# Patient Record
Sex: FEMALE | Race: BLACK OR AFRICAN AMERICAN | NOT HISPANIC OR LATINO | Employment: OTHER | URBAN - METROPOLITAN AREA
[De-identification: names, ages, dates, MRNs, and addresses within clinical notes are randomized per-mention and may not be internally consistent; named-entity substitution may affect disease eponyms.]

---

## 2017-01-05 ENCOUNTER — OFFICE VISIT (OUTPATIENT)
Dept: ORTHOPEDICS | Facility: CLINIC | Age: 67
End: 2017-01-05
Payer: MEDICARE

## 2017-01-05 ENCOUNTER — HOSPITAL ENCOUNTER (OUTPATIENT)
Dept: RADIOLOGY | Facility: HOSPITAL | Age: 67
Discharge: HOME | End: 2017-01-05
Attending: ORTHOPAEDIC SURGERY
Payer: MEDICARE

## 2017-01-05 VITALS
RESPIRATION RATE: 18 BRPM | BODY MASS INDEX: 43.57 KG/M2 | HEIGHT: 68 IN | DIASTOLIC BLOOD PRESSURE: 83 MMHG | HEART RATE: 71 BPM | SYSTOLIC BLOOD PRESSURE: 148 MMHG | WEIGHT: 287.5 LBS

## 2017-01-05 DIAGNOSIS — M17.0 PRIMARY OSTEOARTHRITIS OF BOTH KNEES: ICD-10-CM

## 2017-01-05 DIAGNOSIS — G89.29 CHRONIC PAIN OF BOTH KNEES: Primary | ICD-10-CM

## 2017-01-05 DIAGNOSIS — M25.561 CHRONIC PAIN OF BOTH KNEES: ICD-10-CM

## 2017-01-05 DIAGNOSIS — G89.29 CHRONIC PAIN OF BOTH KNEES: ICD-10-CM

## 2017-01-05 DIAGNOSIS — M25.562 CHRONIC PAIN OF BOTH KNEES: ICD-10-CM

## 2017-01-05 DIAGNOSIS — M25.562 CHRONIC PAIN OF BOTH KNEES: Primary | ICD-10-CM

## 2017-01-05 DIAGNOSIS — M25.561 CHRONIC PAIN OF BOTH KNEES: Primary | ICD-10-CM

## 2017-01-05 PROCEDURE — 99999 PR PBB SHADOW E&M-EST. PATIENT-LVL III: CPT | Mod: PBBFAC,,, | Performed by: ORTHOPAEDIC SURGERY

## 2017-01-05 PROCEDURE — 73562 X-RAY EXAM OF KNEE 3: CPT | Mod: 26,50,, | Performed by: RADIOLOGY

## 2017-01-05 PROCEDURE — 73562 X-RAY EXAM OF KNEE 3: CPT | Mod: TC,50

## 2017-01-05 PROCEDURE — 99214 OFFICE O/P EST MOD 30 MIN: CPT | Mod: S$PBB,,, | Performed by: ORTHOPAEDIC SURGERY

## 2017-01-05 NOTE — PROGRESS NOTES
HPI:    Ena Miller is a 66 y.o. female who is here today for   Chief Complaint   Patient presents with    Left Knee - Pain    Right Knee - Pain   .  Patient has osteoarthritis of both knees.  She is not interested in surgery.  She is interested in physical therapy.    Duration: 12 months  Intensity: severe  Character of pain: sharp  Location: She reports that the pain is predominately  medial    Past Medical History   Diagnosis Date    Angina pectoris     Diabetes mellitus type I     Hypertension     Sleep apnea           Current Outpatient Prescriptions:     atorvastatin (LIPITOR) 80 MG tablet, 80 mg once daily. , Disp: , Rfl:     diltiazem (CARDIZEM) 120 MG tablet, 120 mg every 12 (twelve) hours. , Disp: , Rfl:     hydrALAZINE (APRESOLINE) 50 MG tablet, 50 mg every 8 (eight) hours. , Disp: , Rfl:     isosorbide dinitrate (ISORDIL) 30 MG Tab, 30 mg 2 (two) times daily. , Disp: , Rfl:     KLOR-CON M20 20 mEq tablet, 20 mEq 2 (two) times daily. , Disp: , Rfl:     losartan (COZAAR) 100 MG tablet, 100 mg once daily. , Disp: , Rfl:     metformin (GLUCOPHAGE) 500 MG tablet, 500 mg 2 (two) times daily with meals. Pt states takes two (500) mg every evening, Disp: , Rfl:     polyethylene glycol (GLYCOLAX) 17 gram/dose powder, 17 g once daily. , Disp: , Rfl:     tramadol (ULTRAM) 50 mg tablet, 50 mg as needed. , Disp: , Rfl:     triamterene-hydrochlorothiazide 37.5-25 mg (MAXZIDE-25) 37.5-25 mg per tablet, 1 tablet once daily. , Disp: , Rfl:     dexamethasone (DECADRON) 4 mg/mL injection, 1/2 vial for each iontophoresis tx., Disp: 5 mL, Rfl: 3     Review of patient's allergies indicates:   Allergen Reactions    Penicillins Shortness Of Breath    Flagyl [metronidazole hcl]         ROS  Constitutional: Negative for fever, Negative for weight loss  HENT Negative for congestion  Cardiovascular: Negative chest pain  Respiratory: Negative Shortness of breath  Heme: Negative excessive  "bleeding  Skin:NegativeItching, Negative breakdown  Musculoskeletal:Negative for back pain, Positive for joint pain, Negative muscle pain, Negative muscle weakness  Neurological: Negative for numbness and paresthesias   Psychiatric/Behavioral: Negative altered mental status, Negative for depression    Physical Exam:   Visit Vitals    BP (!) 148/83 (BP Location: Right arm, Patient Position: Sitting, BP Method: Automatic)    Pulse 71    Resp 18    Ht 5' 8" (1.727 m)    Wt 130.4 kg (287 lb 7.7 oz)    BMI 43.71 kg/m2     General appearance: This is a well-developed, Well nourished female No obvious acute distress.  Psychiatric: normal mood and affect;  pleasant and conversant; judgment and thought content normal  Gait is coordinated. Patient has antalgic gait to the right, left and bilateral  Cardiovascular: There are no swelling or varicosities present.   Respiratory: normal respiratory effort   Lymphatic: no adenopathy   Neurologic: alert and oriented to person, place, and time   Deep Tendon Reflexes are normal;  Coordination and Balance: Normal   Musculoskeletal   Neck    ROM shows normal flexion and extension and lateral rotation    Palpation: Non-tender    Stability is normal    Strength is normal    Skin is normal without masses and lesions    Sensation is intact to light touch   Back    ROM showsnormal flexion, extension    and  rotation    Palpation shows no masses    Stability is normal    Strength to flexion and extension well maintained    Core strength is diminished    Skin shows no rashes or cafe au lait spots;     Sensation is intact to light touch    Right Knee  Swelling Mild  TendernessMedial joint line  Range of Motion:     Left Knee: Swelling Mild  TendernessMedial joint line  Range of Motion: 115    Radiograph   Osteoarthritis: severe  Angle: mild varus    Assessment: Osteoarthritis of the knees.    Plan:  I've written for the patient to get physical therapy and and she'll work on her " weight reduction.  Present BMI is 43.7 and I've explained the risk to her.

## 2017-06-08 ENCOUNTER — OFFICE VISIT (OUTPATIENT)
Dept: OPHTHALMOLOGY | Facility: CLINIC | Age: 67
End: 2017-06-08
Payer: MEDICARE

## 2017-06-08 DIAGNOSIS — H10.9 BACTERIAL CONJUNCTIVITIS OF LEFT EYE: Primary | ICD-10-CM

## 2017-06-08 PROCEDURE — 1159F MED LIST DOCD IN RCRD: CPT | Mod: ,,, | Performed by: OPHTHALMOLOGY

## 2017-06-08 PROCEDURE — 99213 OFFICE O/P EST LOW 20 MIN: CPT | Mod: PBBFAC | Performed by: OPHTHALMOLOGY

## 2017-06-08 PROCEDURE — 99203 OFFICE O/P NEW LOW 30 MIN: CPT | Mod: S$PBB,,, | Performed by: OPHTHALMOLOGY

## 2017-06-08 PROCEDURE — 99999 PR PBB SHADOW E&M-EST. PATIENT-LVL III: CPT | Mod: PBBFAC,,, | Performed by: OPHTHALMOLOGY

## 2017-06-08 PROCEDURE — 1126F AMNT PAIN NOTED NONE PRSNT: CPT | Mod: ,,, | Performed by: OPHTHALMOLOGY

## 2017-06-08 RX ORDER — GABAPENTIN 300 MG/1
CAPSULE ORAL
COMMUNITY
Start: 2017-05-13

## 2017-06-08 RX ORDER — POLYMYXIN B SULFATE AND TRIMETHOPRIM 1; 10000 MG/ML; [USP'U]/ML
SOLUTION OPHTHALMIC
COMMUNITY
Start: 2017-06-07

## 2017-06-08 RX ORDER — CLARITHROMYCIN 500 MG/1
TABLET, FILM COATED ORAL
Refills: 0 | COMMUNITY
Start: 2017-03-09 | End: 2017-06-08

## 2017-06-08 RX ORDER — LEVOFLOXACIN 500 MG/1
TABLET, FILM COATED ORAL
Refills: 0 | COMMUNITY
Start: 2017-03-09 | End: 2018-10-30 | Stop reason: ALTCHOICE

## 2017-06-08 NOTE — PATIENT INSTRUCTIONS
Continue the eye drops for another 7 to 10 days. If eye clears up, you can call for an appointment for eyeglasses. If the problem is not getting better, call for an appointment with me.

## 2017-06-08 NOTE — PROGRESS NOTES
HPI     Triage pt  Patient here f/u Urgent Care.  Pt states OS red and swollen x 1 day ago. Mucus and tearing     Eye Drops:Polymyxin TID OS    Patient reports onset of symptoms 2 days ago with left eye redness,   tearing, crusting.   Patient denies FBS, photophobia, trauma, contact lens use.   Recent URI symptoms for past week.     PMHx  HTN  DM  Angina  VENANCIO    POH  Denies surgery, trauma, contacts  Wears glasses    Last edited by Zak Cartagena MD on 6/8/2017  4:26 PM. (History)            Assessment /Plan     For exam results, see Encounter Report.    Bacterial conjunctivitis of left eye    Continue Polymixin TID OS for 7 to 10 days. If not clearing up, will call in for follow up.

## 2017-11-15 DIAGNOSIS — M17.0 PRIMARY OSTEOARTHRITIS OF BOTH KNEES: Primary | ICD-10-CM

## 2017-11-15 DIAGNOSIS — M51.36 DEGENERATION OF LUMBAR INTERVERTEBRAL DISC: ICD-10-CM

## 2018-01-23 ENCOUNTER — OFFICE VISIT (OUTPATIENT)
Dept: DERMATOLOGY | Facility: CLINIC | Age: 68
End: 2018-01-23
Payer: MEDICARE

## 2018-01-23 DIAGNOSIS — R21 RASH AND NONSPECIFIC SKIN ERUPTION: Primary | ICD-10-CM

## 2018-01-23 DIAGNOSIS — H01.133 ECZEMATOUS DERMATITIS OF EYELIDS OF BOTH EYES: ICD-10-CM

## 2018-01-23 DIAGNOSIS — H01.136 ECZEMATOUS DERMATITIS OF EYELIDS OF BOTH EYES: ICD-10-CM

## 2018-01-23 PROCEDURE — 88305 TISSUE EXAM BY PATHOLOGIST: CPT | Mod: 26,,, | Performed by: PATHOLOGY

## 2018-01-23 PROCEDURE — 99999 PR PBB SHADOW E&M-EST. PATIENT-LVL III: CPT | Mod: PBBFAC,,, | Performed by: PATHOLOGY

## 2018-01-23 PROCEDURE — 99203 OFFICE O/P NEW LOW 30 MIN: CPT | Mod: 25,S$PBB,, | Performed by: PATHOLOGY

## 2018-01-23 PROCEDURE — 11100 PR BIOPSY OF SKIN LESION: CPT | Mod: PBBFAC | Performed by: PATHOLOGY

## 2018-01-23 PROCEDURE — 11101 PR BIOPSY, EACH ADDED LESION: CPT | Mod: S$PBB,,, | Performed by: PATHOLOGY

## 2018-01-23 PROCEDURE — 99213 OFFICE O/P EST LOW 20 MIN: CPT | Mod: PBBFAC,25 | Performed by: PATHOLOGY

## 2018-01-23 PROCEDURE — 11101 PR BIOPSY, EACH ADDED LESION: CPT | Mod: PBBFAC | Performed by: PATHOLOGY

## 2018-01-23 PROCEDURE — 88341 IMHCHEM/IMCYTCHM EA ADD ANTB: CPT | Mod: 26,,, | Performed by: PATHOLOGY

## 2018-01-23 PROCEDURE — 11100 PR BIOPSY OF SKIN LESION: CPT | Mod: S$PBB,,, | Performed by: PATHOLOGY

## 2018-01-23 PROCEDURE — 88342 IMHCHEM/IMCYTCHM 1ST ANTB: CPT | Mod: 26,,, | Performed by: PATHOLOGY

## 2018-01-23 PROCEDURE — 88312 SPECIAL STAINS GROUP 1: CPT | Mod: 26,,, | Performed by: PATHOLOGY

## 2018-01-23 PROCEDURE — 81342 TRG GENE REARRANGEMENT ANAL: CPT | Performed by: PATHOLOGY

## 2018-01-23 RX ORDER — HYDROCORTISONE 25 MG/G
CREAM TOPICAL 2 TIMES DAILY
Qty: 30 G | Refills: 1 | Status: SHIPPED | OUTPATIENT
Start: 2018-01-23 | End: 2023-10-06

## 2018-01-23 NOTE — LETTER
January 23, 2018      Rohith Bassett MD  [address] [facility] [address]           [facility] - Dermatology  [address]  [place] [address]  Phone: [phone]  Fax: [phone]          Patient: Ena Miller   MR Number: [id number]   YOB: 1950   Date of Visit: 1/23/2018       Dear Dr. Rohith Bassett:    Thank you for referring Ena Miller to me for evaluation. Attached you will find relevant portions of my assessment and plan of care.    If you have questions, please do not hesitate to call me. I look forward to following Ena Miller along with you.    Sincerely,    Shaun Parra MD    Enclosure  CC:  No Recipients    If you would like to receive this communication electronically, please contact [email or web address] or [phone] to request more information on [facility] access.    For providers and/or their staff who would like to refer a patient to [facility], please contact us through our one-stop-shop provider referral line, [facility], at [phone].    If you feel you have received this communication in error or would no longer like to receive these types of communications, please e-mail [email or web address]

## 2018-01-23 NOTE — PROGRESS NOTES
Concerns is spots on her body.  Subjective:       Patient ID:  Ena Miller is a 67 y.o. female who presents for   Chief Complaint   Patient presents with    Spot     HPI  Pt with about 2 year h/o recurrent red, scaly spots to torso and extremities - start as red bumps and eventually scab over.  Heal with hyperpigmentation.  Minimally pruritic.  Was told that it was pityriasis rosea in the past.  Not treating.  Also with dark scaly areas to bilateral eyelids and periorbital area x several months - pruritic; not treating.    Review of Systems   Constitutional: Positive for weight loss and fatigue. Negative for fever, chills, weight gain, night sweats and malaise.   Skin: Positive for itching, rash, dry skin and dry lips. Negative for sun sensitivity, daily sunscreen use, recent sunburn, abscesses and wears hat.        Objective:    Physical Exam   Constitutional: She appears well-developed and well-nourished.   Neurological: She is alert.   Skin:   Areas Examined (abnormalities noted in diagram):   Scalp / Hair Palpated and Inspected  Head / Face Inspection Performed  Neck Inspection Performed  Chest / Axilla Inspection Performed  Abdomen Inspection Performed  Genitals / Buttocks / Groin Inspection Performed  Back Inspection Performed  RUE Inspected  LUE Inspection Performed  RLE Inspected  LLE Inspection Performed  Nails and Digits Inspection Performed                   Diagram Legend     Erythematous scaling macule/papule c/w actinic keratosis       Vascular papule c/w angioma      Pigmented verrucoid papule/plaque c/w seborrheic keratosis      Yellow umbilicated papule c/w sebaceous hyperplasia      Irregularly shaped tan macule c/w lentigo     1-2 mm smooth white papules consistent with Milia      Movable subcutaneous cyst with punctum c/w epidermal inclusion cyst      Subcutaneous movable cyst c/w pilar cyst      Firm pink to brown papule c/w dermatofibroma      Pedunculated fleshy papule(s) c/w skin tag(s)       Evenly pigmented macule c/w junctional nevus     Mildly variegated pigmented, slightly irregular-bordered macule c/w mildly atypical nevus      Flesh colored to evenly pigmented papule c/w intradermal nevus       Pink pearly papule/plaque c/w basal cell carcinoma      Erythematous hyperkeratotic cursted plaque c/w SCC      Surgical scar with no sign of skin cancer recurrence      Open and closed comedones      Inflammatory papules and pustules      Verrucoid papule consistent consistent with wart     Erythematous eczematous patches and plaques     Dystrophic onycholytic nail with subungual debris c/w onychomycosis     Umbilicated papule    Erythematous-base heme-crusted tan verrucoid plaque consistent with inflamed seborrheic keratosis     Erythematous Silvery Scaling Plaque c/w Psoriasis     See annotation      Assessment / Plan:   Rule Out - Biopsy 1: Lichen Planus versus Pityriasis Lichenoides Chronica versus Pityriasis Lichenoides Et Varioliformis Acuta versus Other.    Rule Out - Biopsy 2: Lichen Planus versus Pityriasis Lichenoides Chronica versus Pityriasis Lichenoides Et Varioliformis Acuta versus Other.        Pathology Orders:     Normal Orders This Visit    Tissue Specimen To Pathology, Dermatology     Questions:    Directional Terms:  Other(comment)    Left    Lateral    Clinical information:  erythematous, scaly papules; r/o PLC vs LP vs other    Specific Site:  thigh    Tissue Specimen To Pathology, Dermatology     Questions:    Directional Terms:  Other(comment)    Right    Clinical information:  erythematous, scaly papules; r/o PLC vs LP vs other    Specific Site:  chest        Rash and nonspecific skin eruption  -     Tissue Specimen To Pathology, Dermatology  -     Tissue Specimen To Pathology, Dermatology    Punch biopsy procedure note:  Punch biopsies x 2 performed after verbal consent obtained. Area marked and prepped with alcohol. Approximately 1cc of 1% lidocaine with epinephrine  injected. 3 mm disposable punch used to remove lesion. Hemostasis obtained and biopsy site closed with 1 - 2 Prolene sutures. Wound care instructions reviewed with patient and handout given.      Eczematous dermatitis of eyelids of both eyes  -     hydrocortisone 2.5 % cream; Apply topically 2 (two) times daily. To affected areas of eyelids; do not get in eyes  Dispense: 30 g; Refill: 1             Follow-up in about 2 weeks (around 2/6/2018).

## 2018-01-23 NOTE — PATIENT INSTRUCTIONS
Punch Biopsy Wound Care    Your doctor has performed a punch biopsy today.  A band aid and antibiotic ointment has been placed over the site.  This should remain in place for 24 hours.  It is recommended that you keep the area dry for the first 24 hours.  After 24 hours, you may remove the band aid and wash the area with warm soap and water and apply Vaseline jelly.  Many patients prefer to use Neosporin or Bacitracin ointment.  This is acceptable; however know that you can develop an allergy to this medication even if you have used it safely for years.  It is important to keep the area moist.  Letting it dry out and get air slows healing time, will worsen the scar, and make it more difficult to remove the stitches if they were placed.  Band aid is optional after first 24 hours.      If you notice increasing redness, tenderness, pain, or yellow drainage at the biopsy or surgical site, please notify your doctor.  These are signs of an infection.    If your biopsy/surgical site is bleeding, apply firm pressure for 15 minutes straight.  Repeat for another 15 minutes, if it is still bleeding.   If the surgical site continues to bleed, then please contact your doctor.      [address] [place] [address]/ [phone] FAX/ [email or web address]

## 2018-02-08 ENCOUNTER — TELEPHONE (OUTPATIENT)
Dept: DERMATOLOGY | Facility: CLINIC | Age: 68
End: 2018-02-08

## 2018-02-08 ENCOUNTER — OFFICE VISIT (OUTPATIENT)
Dept: DERMATOLOGY | Facility: CLINIC | Age: 68
End: 2018-02-08
Payer: MEDICARE

## 2018-02-08 DIAGNOSIS — Z48.02 VISIT FOR SUTURE REMOVAL: Primary | ICD-10-CM

## 2018-02-08 DIAGNOSIS — L41.1 PLC (PITYRIASIS LICHENOIDES CHRONICA): ICD-10-CM

## 2018-02-08 PROCEDURE — 99999 PR PBB SHADOW E&M-EST. PATIENT-LVL II: CPT | Mod: PBBFAC,,, | Performed by: PATHOLOGY

## 2018-02-08 PROCEDURE — 99024 POSTOP FOLLOW-UP VISIT: CPT | Mod: POP,,, | Performed by: PATHOLOGY

## 2018-02-08 PROCEDURE — 99212 OFFICE O/P EST SF 10 MIN: CPT | Mod: PBBFAC | Performed by: PATHOLOGY

## 2018-02-08 RX ORDER — TRIAMCINOLONE ACETONIDE 1 MG/G
CREAM TOPICAL
Qty: 454 G | Refills: 3 | Status: SHIPPED | OUTPATIENT
Start: 2018-02-08 | End: 2018-03-12 | Stop reason: SDUPTHER

## 2018-02-08 NOTE — TELEPHONE ENCOUNTER
Left message for patient to give us a call back   ----- Message from Shaun Parra MD sent at 2/8/2018 10:44 AM CST -----  Please call pt and let her know I sent rx for triamcinolone cream to her [facility].  Have her use this bid to all of the scaly areas.  We can talk about other treatment options at her followup appt (like minocycline, MTX or light treatments).  [facility] set up about 2 month f/u appt.  Vivek  bv

## 2018-02-08 NOTE — PROGRESS NOTES
Suture Removal note:  CC: 67 y.o. female patient is here for suture removal.         HPI: Patient is s/p excision of changes most consistent with pityriasis lichenoides chronica from the left lateral thigh and right chest on 01/23/2018.  Patient reports no problems.    WOUND PE:  Sutures intact.  Wound healing well.  Good approximation of skin edges.  No signs or symptoms of infection.    IMPRESSION: 1. Skin, left lateral thigh, punch biopsy:  - CHANGES MOST CONSISTENT WITH PITYRIASIS LICHENOIDES CHRONICA (SEE MICROSCOPIC  DESCRIPTION). MICROSCOPIC DESCRIPTION: The biopsy shows confluent focal parakeratosis. The epidermis is remarkable for  exocytosis of lymphocytes, mild spongiosis and vacuolar-interface changes with dyskeratotic cells located at various  levels throughout the epidermis. There is a superficial and mid dermal vaguely wedge shaped lymphohistiocytic  infiltrate. Extravasated erythocytes are noted within the papillary dermis and epidermis. CD1a  immunohistochemical stain reveals rare intraepidermal Langerhans cells. Appropriately reactive controls were  reviewed. Multiple levels were examined.  2. Skin, right chest, punch biopsy:  - CHANGES MOST CONSISTENT WITH PITYRIASIS LICHENOIDES CHRONICA (SEE MICROSCOPIC  DESCRIPTION).  MICROSCOPIC DESCRIPTION: The biopsy shows confluent parakeratosis that is focally associated with  neutrophils. The epidermis is remarkable for prominent exocytosis of lymphocytes, mild spongiosis and  vacuolar-interface changes with dyskeratotic cells located at various levels throughout the epidermis. There is a  superficial and mid dermal wedge shaped lymphohistiocytic infiltrate. Extravasated erythocytes are noted within the  papillary dermis and epidermis. The majority of the lymphocytes within the superficial dermal infiltrate and within  the epidermis stain positive for both CD3 and CD4, with CD8 staining a somewhat sparser population of cells. The  approximate CD4 to CD8  ratio is 5:1. Appropriately reactive controls were reviewed. Multiple levels were  examined. T-cell receptor gene rearrangement analysis is pending, and results will be reported in an addendum.      PLAN:  Sutures removed today.  Continue wound care.  Pt had to leave before being seen by MD.  Prescribed TAC cream bid to affected areas.  Will consider tetracycline family antibiotic vs MTX vs nbUVB at next visit.    RTC: PRN

## 2018-02-09 ENCOUNTER — TELEPHONE (OUTPATIENT)
Dept: DERMATOLOGY | Facility: CLINIC | Age: 68
End: 2018-02-09

## 2018-02-09 NOTE — TELEPHONE ENCOUNTER
----- Message from Shaun Parra MD sent at 2/8/2018 10:44 AM CST -----  Please call pt and let her know I sent rx for triamcinolone cream to her [facility].  Have her use this bid to all of the scaly areas.  We can talk about other treatment options at her followup appt (like minocycline, MTX or light treatments).  [facility] set up about 2 month f/u appt.  jr Doyle

## 2018-02-16 ENCOUNTER — TELEPHONE (OUTPATIENT)
Dept: DERMATOLOGY | Facility: CLINIC | Age: 68
End: 2018-02-16

## 2018-03-07 ENCOUNTER — TELEPHONE (OUTPATIENT)
Dept: DERMATOLOGY | Facility: CLINIC | Age: 68
End: 2018-03-07

## 2018-03-07 NOTE — TELEPHONE ENCOUNTER
Left message for patient to call us back when she gets home   ----- Message from Joyce Brenner sent at 3/7/2018  9:30 AM CST -----  Contact: pt   BV-pt- pt is returning the nurses phone call about her prescription pt was told Dr Parra ordered some medication for her can you please call pt at [phone] pt won't be back home until 3:30 pt goes to [facility] pharmacy [facility] in [place] phone number [phone]    GLENN

## 2018-03-09 ENCOUNTER — TELEPHONE (OUTPATIENT)
Dept: DERMATOLOGY | Facility: CLINIC | Age: 68
End: 2018-03-09

## 2018-03-09 NOTE — TELEPHONE ENCOUNTER
----- Message from Joyce Brenner sent at 3/9/2018 12:53 PM CST -----  Contact: pt   BV-pt- pt is calling to speak with the nurse pt was told she has a prescription for the stuff on her body pt said she hasn't received her prescription can you please call pt at  [phone] pt needs her medication before her next appt on April 19th     GLENN

## 2018-03-12 DIAGNOSIS — L41.1 PLC (PITYRIASIS LICHENOIDES CHRONICA): ICD-10-CM

## 2018-03-12 RX ORDER — TRIAMCINOLONE ACETONIDE 1 MG/G
CREAM TOPICAL
Qty: 454 G | Refills: 3 | Status: SHIPPED | OUTPATIENT
Start: 2018-03-12

## 2018-03-13 ENCOUNTER — TELEPHONE (OUTPATIENT)
Dept: DERMATOLOGY | Facility: CLINIC | Age: 68
End: 2018-03-13

## 2018-03-13 NOTE — TELEPHONE ENCOUNTER
----- Message from Shaun Parra MD sent at 3/12/2018 11:31 AM CDT -----  Done! Please let her know the prescription was sent!  bv  ----- Message -----  From: Michael Gruber LPN  Sent: 3/9/2018   1:07 PM  To: Shaun Parra MD    Pt. Would like the TAC sent to a different [facility] has been updated in file.

## 2018-04-19 ENCOUNTER — OFFICE VISIT (OUTPATIENT)
Dept: DERMATOLOGY | Facility: CLINIC | Age: 68
End: 2018-04-19
Payer: MEDICARE

## 2018-04-19 DIAGNOSIS — L41.1 PLC (PITYRIASIS LICHENOIDES CHRONICA): Primary | ICD-10-CM

## 2018-04-19 DIAGNOSIS — L91.0 KELOID: ICD-10-CM

## 2018-04-19 PROCEDURE — 99999 PR PBB SHADOW E&M-EST. PATIENT-LVL III: CPT | Mod: PBBFAC,,, | Performed by: PATHOLOGY

## 2018-04-19 PROCEDURE — 11900 INJECT SKIN LESIONS </W 7: CPT | Mod: PBBFAC | Performed by: PATHOLOGY

## 2018-04-19 PROCEDURE — 11900 INJECT SKIN LESIONS </W 7: CPT | Mod: S$PBB,,, | Performed by: PATHOLOGY

## 2018-04-19 PROCEDURE — 99214 OFFICE O/P EST MOD 30 MIN: CPT | Mod: S$PBB,25,, | Performed by: PATHOLOGY

## 2018-04-19 PROCEDURE — 99213 OFFICE O/P EST LOW 20 MIN: CPT | Mod: PBBFAC | Performed by: PATHOLOGY

## 2018-04-19 RX ORDER — METFORMIN HYDROCHLORIDE 1000 MG/1
TABLET, FILM COATED, EXTENDED RELEASE ORAL
COMMUNITY
Start: 2018-02-07

## 2018-04-19 RX ORDER — MINOCYCLINE HYDROCHLORIDE 100 MG/1
100 CAPSULE ORAL EVERY 12 HOURS
Qty: 60 CAPSULE | Refills: 2 | Status: SHIPPED | OUTPATIENT
Start: 2018-04-19 | End: 2018-10-30 | Stop reason: ALTCHOICE

## 2018-04-19 NOTE — PROGRESS NOTES
Subjective:       Patient ID:  Ena Miller is a 67 y.o. female who presents for   Chief Complaint   Patient presents with    Follow-up     Rash     History of Present Illness: The patient presents for follow up of a 2 year history of a rash. Last seen 1/2018 at which time lesions were concerning for PLC, and had two biopsies. Lesions continue to pop up and she feels like her rash is worse. Does itch.   She is using triamcinolone cream and hydrocortisone on the face.       1. Skin, left lateral thigh, punch biopsy:  - CHANGES MOST CONSISTENT WITH PITYRIASIS LICHENOIDES CHRONICA (SEE MICROSCOPIC  DESCRIPTION).  MICROSCOPIC DESCRIPTION: The biopsy shows confluent focal parakeratosis. The epidermis is remarkable for  exocytosis of lymphocytes, mild spongiosis and vacuolar-interface changes with dyskeratotic cells located at various  levels throughout the epidermis. There is a superficial and mid dermal vaguely wedge shaped lymphohistiocytic  infiltrate. Extravasated erythocytes are noted within the papillary dermis and epidermis. CD1a  immunohistochemical stain reveals rare intraepidermal Langerhans cells. Appropriately reactive controls were  reviewed. Multiple levels were examined.    2. Skin, right chest, punch biopsy:  - CHANGES MOST CONSISTENT WITH PITYRIASIS LICHENOIDES CHRONICA (SEE MICROSCOPIC  DESCRIPTION).  MICROSCOPIC        Review of Systems   Constitutional: Negative for fever, chills and fatigue.   Skin: Positive for itching and rash.   Hematologic/Lymphatic: Bruises/bleeds easily.        Objective:    Physical Exam   Constitutional: She appears well-developed and well-nourished.   Neurological: She is alert.   Skin:   Areas Examined (abnormalities noted in diagram):   Scalp / Hair Palpated and Inspected  Head / Face Inspection Performed  Neck Inspection Performed  Chest / Axilla Inspection Performed  Abdomen Inspection Performed  Genitals / Buttocks / Groin Inspection Performed  Back Inspection  Performed  RUE Inspected  LUE Inspection Performed  RLE Inspected  LLE Inspection Performed  Nails and Digits Inspection Performed                   Diagram Legend     Erythematous scaling macule/papule c/w actinic keratosis       Vascular papule c/w angioma      Pigmented verrucoid papule/plaque c/w seborrheic keratosis      Yellow umbilicated papule c/w sebaceous hyperplasia      Irregularly shaped tan macule c/w lentigo     1-2 mm smooth white papules consistent with Milia      Movable subcutaneous cyst with punctum c/w epidermal inclusion cyst      Subcutaneous movable cyst c/w pilar cyst      Firm pink to brown papule c/w dermatofibroma      Pedunculated fleshy papule(s) c/w skin tag(s)      Evenly pigmented macule c/w junctional nevus     Mildly variegated pigmented, slightly irregular-bordered macule c/w mildly atypical nevus      Flesh colored to evenly pigmented papule c/w intradermal nevus       Pink pearly papule/plaque c/w basal cell carcinoma      Erythematous hyperkeratotic cursted plaque c/w SCC      Surgical scar with no sign of skin cancer recurrence      Open and closed comedones      Inflammatory papules and pustules      Verrucoid papule consistent consistent with wart     Erythematous eczematous patches and plaques     Dystrophic onycholytic nail with subungual debris c/w onychomycosis     Umbilicated papule    Erythematous-base heme-crusted tan verrucoid plaque consistent with inflamed seborrheic keratosis     Erythematous Silvery Scaling Plaque c/w Psoriasis     See annotation      Assessment / Plan:        PLC (pityriasis lichenoides chronica)  -     minocycline (MINOCIN,DYNACIN) 100 MG capsule; Take 1 capsule (100 mg total) by mouth every 12 (twelve) hours.  Dispense: 60 capsule; Refill: 2    - Patient with two bx'es showing PLC. Discussed diagnosis with patient and options for treatment.  - Start minocycline now. Can use phototherapy if no improvement.  - Continue topical steroids to areas  BID.  - Future workup could include repeat TCR gene study (invalid sample) but given dx of PLC will defer this for now.       Keloids  - Patient with keloids on right neck and right chest from prior lesions.  - Injected ILK today.  Intralesional Kenalog 10 mg/cc (0.15 cc total) injected into 2 lesions on the right neck and right chest today after obtaining verbal consent including risk of surrounding hypopigmentation. Patient tolerated procedure well.    Units: 1  NDC for Kenalog 10mg/cc:  9467-1723-55          Follow-up in about 2 months (around 6/19/2018).

## 2018-09-21 DIAGNOSIS — M54.30 SCIATICA: ICD-10-CM

## 2018-09-21 DIAGNOSIS — M54.50 CHRONIC LUMBAR PAIN: Primary | ICD-10-CM

## 2018-09-21 DIAGNOSIS — M17.0 PRIMARY OSTEOARTHRITIS OF BOTH KNEES: ICD-10-CM

## 2018-09-21 DIAGNOSIS — M54.50 LUMBAGO: Primary | ICD-10-CM

## 2018-09-21 DIAGNOSIS — G89.29 CHRONIC LUMBAR PAIN: Primary | ICD-10-CM

## 2018-09-21 DIAGNOSIS — M51.36 DEGENERATION OF LUMBAR INTERVERTEBRAL DISC: ICD-10-CM

## 2018-10-04 ENCOUNTER — HOSPITAL ENCOUNTER (OUTPATIENT)
Dept: RADIOLOGY | Facility: HOSPITAL | Age: 68
Discharge: HOME | End: 2018-10-04
Attending: ORTHOPAEDIC SURGERY
Payer: MEDICARE

## 2018-10-04 ENCOUNTER — HOSPITAL ENCOUNTER (OUTPATIENT)
Dept: RADIOLOGY | Facility: HOSPITAL | Age: 68
Discharge: HOME | End: 2018-10-04
Attending: INTERNAL MEDICINE
Payer: MEDICARE

## 2018-10-04 ENCOUNTER — OFFICE VISIT (OUTPATIENT)
Dept: ORTHOPEDICS | Facility: CLINIC | Age: 68
End: 2018-10-04
Payer: MEDICARE

## 2018-10-04 VITALS
HEART RATE: 79 BPM | SYSTOLIC BLOOD PRESSURE: 145 MMHG | BODY MASS INDEX: 41.83 KG/M2 | WEIGHT: 276 LBS | DIASTOLIC BLOOD PRESSURE: 83 MMHG | HEIGHT: 68 IN

## 2018-10-04 DIAGNOSIS — M25.562 CHRONIC PAIN OF BOTH KNEES: Primary | ICD-10-CM

## 2018-10-04 DIAGNOSIS — M25.561 CHRONIC PAIN OF BOTH KNEES: ICD-10-CM

## 2018-10-04 DIAGNOSIS — G89.29 CHRONIC PAIN OF BOTH KNEES: Primary | ICD-10-CM

## 2018-10-04 DIAGNOSIS — M25.561 CHRONIC PAIN OF BOTH KNEES: Primary | ICD-10-CM

## 2018-10-04 DIAGNOSIS — G89.29 CHRONIC PAIN OF BOTH KNEES: ICD-10-CM

## 2018-10-04 DIAGNOSIS — G89.29 CHRONIC LUMBAR PAIN: ICD-10-CM

## 2018-10-04 DIAGNOSIS — M54.50 CHRONIC LUMBAR PAIN: ICD-10-CM

## 2018-10-04 DIAGNOSIS — M25.562 CHRONIC PAIN OF BOTH KNEES: ICD-10-CM

## 2018-10-04 PROCEDURE — 99214 OFFICE O/P EST MOD 30 MIN: CPT | Mod: S$PBB,,, | Performed by: ORTHOPAEDIC SURGERY

## 2018-10-04 PROCEDURE — 73562 X-RAY EXAM OF KNEE 3: CPT | Mod: 26,50,, | Performed by: RADIOLOGY

## 2018-10-04 PROCEDURE — 99999 PR PBB SHADOW E&M-EST. PATIENT-LVL III: CPT | Mod: PBBFAC,,, | Performed by: ORTHOPAEDIC SURGERY

## 2018-10-04 PROCEDURE — 73562 X-RAY EXAM OF KNEE 3: CPT | Mod: TC,50

## 2018-10-04 PROCEDURE — 72148 MRI LUMBAR SPINE W/O DYE: CPT | Mod: 26,,, | Performed by: RADIOLOGY

## 2018-10-04 PROCEDURE — 72148 MRI LUMBAR SPINE W/O DYE: CPT | Mod: TC

## 2018-10-04 PROCEDURE — 99213 OFFICE O/P EST LOW 20 MIN: CPT | Mod: PBBFAC,25 | Performed by: ORTHOPAEDIC SURGERY

## 2018-10-04 NOTE — PROGRESS NOTES
HPI:    Ena Miller is a 68 y.o. female who is here today for   Chief Complaint   Patient presents with    back and knee pain     pt states she also has trap nerves in her thigh   .  Patient was seen a year and a half ago and was not interested in any surgery.  At this time her medical condition is such that she is not a surgical candidate.    Duration: 24 months  Intensity: severe  Character of pain: sharp  Location: She reports that the pain is predominately  medial    Past Medical History:   Diagnosis Date    Anemia     Angina pectoris     Arthritis     Diabetes mellitus type I     Hypertension     Joint pain     Sleep apnea           Current Outpatient Medications:     atorvastatin (LIPITOR) 80 MG tablet, 80 mg once daily. , Disp: , Rfl:     diltiazem (CARDIZEM) 120 MG tablet, 120 mg every 12 (twelve) hours. , Disp: , Rfl:     gabapentin (NEURONTIN) 300 MG capsule, , Disp: , Rfl:     hydrALAZINE (APRESOLINE) 50 MG tablet, 50 mg every 8 (eight) hours. , Disp: , Rfl:     isosorbide dinitrate (ISORDIL) 30 MG Tab, 30 mg 2 (two) times daily. , Disp: , Rfl:     KLOR-CON M20 20 mEq tablet, 20 mEq 2 (two) times daily. , Disp: , Rfl:     levoFLOXacin (LEVAQUIN) 500 MG tablet, TK 1 T PO BID FOR 10 DAYS, Disp: , Rfl: 0    losartan (COZAAR) 100 MG tablet, 100 mg once daily. , Disp: , Rfl:     metFORMIN (GLUMETZA) 1000 MG (MOD) 24 hr tablet, , Disp: , Rfl:     minocycline (MINOCIN,DYNACIN) 100 MG capsule, Take 1 capsule (100 mg total) by mouth every 12 (twelve) hours., Disp: 60 capsule, Rfl: 2    polyethylene glycol (GLYCOLAX) 17 gram/dose powder, 17 g once daily. , Disp: , Rfl:     polymyxin B sulf-trimethoprim (POLYTRIM) 10,000 unit- 1 mg/mL Drop, , Disp: , Rfl:     tramadol (ULTRAM) 50 mg tablet, 50 mg as needed. , Disp: , Rfl:     triamcinolone acetonide 0.1% (KENALOG) 0.1 % cream, AAA bid, Disp: 454 g, Rfl: 3    triamterene-hydrochlorothiazide 37.5-25 mg (MAXZIDE-25) 37.5-25 mg per tablet, 1  "tablet once daily. , Disp: , Rfl:     hydrocortisone 2.5 % cream, Apply topically 2 (two) times daily. To affected areas of eyelids; do not get in eyes, Disp: 30 g, Rfl: 1     Review of patient's allergies indicates:   Allergen Reactions    Penicillins Shortness Of Breath    Flagyl [metronidazole hcl]         ROS  Constitutional: Negative for fever, Negative for weight loss  HENT Negative for congestion  Cardiovascular: Negative chest pain  Respiratory: Positive Shortness of breath  Heme: Negative excessive bleeding  Skin:NegativeItching, Negative breakdown      Physical Exam:   BP (!) 145/83   Pulse 79   Ht 5' 8" (1.727 m)   Wt 125.2 kg (276 lb 0.3 oz)   BMI 41.97 kg/m²   General appearance: This is a well-developed, Well nourished female No obvious acute distress.  Psychiatric: normal mood and affect;  pleasant and conversant; judgment and thought content normal  Gait is coordinated. Patient has antalgic gait to the bilateral  Cardiovascular: There are no swelling or varicosities present.   Respiratory: normal respiratory effort   Lymphatic: no adenopathy   Neurologic: alert and oriented to person, place, and time   Deep Tendon Reflexes are normal;  Coordination and Balance: Normal   Musculoskeletal   Neck    ROM shows normal flexion and extension and lateral rotation    Palpation: Non-tender    Stability is normal    Strength is normal    Skin is normal without masses and lesions    Sensation is intact to light touch   Back    ROM showsabnormal flexion, extension    and  rotation    Palpation shows no masses    Stability is normal    Strength to flexion and extension well maintained    Core strength is diminished    Skin shows no rashes or cafe au lait spots;     Sensation is intact to light touch    Right Knee  Swelling Mild  TendernessMedial joint line  Range of Motion:     Left Knee: Swelling Mild  TendernessMedial joint line  Range of Motion:     Radiograph   Osteoarthritis: severe  Angle: " mild varus    Assessment:  Osteoarthritis of the knees.  Plan:  Patient is not a surgical candidate and is not interested in any intervention.  I have given her some recommendations for activities of daily living and low-impact exercises.  Do not plan to see the patient back.

## 2018-10-04 NOTE — LETTER
October 4, 2018      Rohith Bassett MD  [address] [address]           [facility] - Orthopedics  [address], [address]  [facility] [address]  Phone: [phone]          Patient: Ena Miller   MR Number: [id number]   YOB: 1950   Date of Visit: 10/4/2018       Dear Dr. Rohith Bassett:    Thank you for referring Ena Miller to me for evaluation. Attached you will find relevant portions of my assessment and plan of care.    If you have questions, please do not hesitate to call me. I look forward to following Ena Miller along with you.    Sincerely,    [facility] MD    Enclosure  CC:  No Recipients    If you would like to receive this communication electronically, please contact [email or web address] or [phone] to request more information on [employer] Link access.    For providers and/or their staff who would like to refer a patient to [facility], please contact us through our one-stop-shop provider referral line, [facility], at [phone].    If you feel you have received this communication in error or would no longer like to receive these types of communications, please e-mail [email or web address]

## 2018-10-18 DIAGNOSIS — M51.36 DDD (DEGENERATIVE DISC DISEASE), LUMBAR: Primary | ICD-10-CM

## 2018-10-30 ENCOUNTER — OFFICE VISIT (OUTPATIENT)
Dept: ORTHOPEDICS | Facility: CLINIC | Age: 68
End: 2018-10-30
Payer: MEDICARE

## 2018-10-30 ENCOUNTER — HOSPITAL ENCOUNTER (OUTPATIENT)
Dept: RADIOLOGY | Facility: HOSPITAL | Age: 68
Discharge: HOME | End: 2018-10-30
Attending: PHYSICIAN ASSISTANT
Payer: MEDICARE

## 2018-10-30 VITALS
DIASTOLIC BLOOD PRESSURE: 84 MMHG | WEIGHT: 273.25 LBS | SYSTOLIC BLOOD PRESSURE: 140 MMHG | HEART RATE: 86 BPM | BODY MASS INDEX: 41.41 KG/M2 | HEIGHT: 68 IN

## 2018-10-30 DIAGNOSIS — M51.36 DDD (DEGENERATIVE DISC DISEASE), LUMBAR: ICD-10-CM

## 2018-10-30 DIAGNOSIS — M48.062 SPINAL STENOSIS OF LUMBAR REGION WITH NEUROGENIC CLAUDICATION: Primary | ICD-10-CM

## 2018-10-30 PROCEDURE — 72120 X-RAY BEND ONLY L-S SPINE: CPT | Mod: TC

## 2018-10-30 PROCEDURE — 99214 OFFICE O/P EST MOD 30 MIN: CPT | Mod: S$PBB,,, | Performed by: PHYSICIAN ASSISTANT

## 2018-10-30 PROCEDURE — 99999 PR PBB SHADOW E&M-EST. PATIENT-LVL III: CPT | Mod: PBBFAC,,, | Performed by: PHYSICIAN ASSISTANT

## 2018-10-30 PROCEDURE — 99213 OFFICE O/P EST LOW 20 MIN: CPT | Mod: PBBFAC,25 | Performed by: PHYSICIAN ASSISTANT

## 2018-10-30 PROCEDURE — 72120 X-RAY BEND ONLY L-S SPINE: CPT | Mod: 26,,, | Performed by: RADIOLOGY

## 2018-10-30 PROCEDURE — 72100 X-RAY EXAM L-S SPINE 2/3 VWS: CPT | Mod: 26,,, | Performed by: RADIOLOGY

## 2018-10-31 NOTE — H&P (VIEW-ONLY)
DATE: 10/31/2018  PATIENT: Ena Miller    Supervising Physician: Rashard Phelan M.D.    CHIEF COMPLAINT: back and bilateral leg pain    HISTORY:  Ena Miller is a 68 y.o. female here for initial evaluation of low back and bilateral posterior and lateral leg pain (Back - 8, Leg - 8).  The pain has been present since she was 13 years olf but has progressively worsened over the last 2 years. The patient describes the pain as burning and aching.  The pain is worse with walking and standing and improved by leaning forward over a shopping cart. There is associated numbness and tingling. There is no subjective weakness. Prior treatments have included gabapentin, tlenol, tramadol an physical therapy, but no ESIs or surgery.    The patient denies myelopathic symptoms such as handwriting changes or difficulty with buttons/coins/keys. Denies perineal paresthesias, bowel/bladder dysfunction.    PAST MEDICAL/SURGICAL HISTORY:  Past Medical History:   Diagnosis Date    Anemia     Angina pectoris     Arthritis     Diabetes mellitus type I     Hypertension     Joint pain     Sleep apnea      Past Surgical History:   Procedure Laterality Date    ABCESS DRAINAGE      BLADDER REPAIR      x 2     SECTION      HYSTERECTOMY         Medications:   Current Outpatient Medications on File Prior to Visit   Medication Sig Dispense Refill    atorvastatin (LIPITOR) 80 MG tablet 80 mg once daily.       diltiazem (CARDIZEM) 120 MG tablet 120 mg every 12 (twelve) hours.       gabapentin (NEURONTIN) 300 MG capsule       hydrALAZINE (APRESOLINE) 50 MG tablet 50 mg every 8 (eight) hours.       isosorbide dinitrate (ISORDIL) 30 MG Tab 30 mg 2 (two) times daily.       KLOR-CON M20 20 mEq tablet 20 mEq 2 (two) times daily.       losartan (COZAAR) 100 MG tablet 100 mg once daily.       metFORMIN (GLUMETZA) 1000 MG (MOD) 24 hr tablet       polyethylene glycol (GLYCOLAX) 17 gram/dose powder 17 g once daily.        polymyxin B sulf-trimethoprim (POLYTRIM) 10,000 unit- 1 mg/mL Drop       tramadol (ULTRAM) 50 mg tablet 50 mg as needed.       triamcinolone acetonide 0.1% (KENALOG) 0.1 % cream AAA bid 454 g 3    triamterene-hydrochlorothiazide 37.5-25 mg (MAXZIDE-25) 37.5-25 mg per tablet 1 tablet once daily.       hydrocortisone 2.5 % cream Apply topically 2 (two) times daily. To affected areas of eyelids; do not get in eyes 30 g 1     No current facility-administered medications on file prior to visit.        Social History:   Social History     Socioeconomic History    Marital status:      Spouse name: Not on file    Number of children: Not on file    Years of education: Not on file    Highest education level: Not on file   Social Needs    Financial resource strain: Not on file    Food insecurity - worry: Not on file    Food insecurity - inability: Not on file    Transportation needs - medical: Not on file    Transportation needs - non-medical: Not on file   Occupational History    Not on file   Tobacco Use    Smoking status: Never Smoker    Smokeless tobacco: Never Used   Substance and Sexual Activity    Alcohol use: No    Drug use: No    Sexual activity: Not on file   Other Topics Concern    Are you pregnant or think you may be? Not Asked    Breast-feeding Not Asked   Social History Narrative    Not on file       REVIEW OF SYSTEMS:  Constitution: Negative. Negative for chills, fever and night sweats.   Cardiovascular: Negative for chest pain and syncope.   Respiratory: Negative for cough and shortness of breath.   Gastrointestinal: See HPI. Negative for nausea/vomiting. Negative for abdominal pain.  Genitourinary: See HPI. Negative for discoloration or dysuria.  Skin: Negative for dry skin, itching and rash.   Hematologic/Lymphatic: Negative for bleeding problem. Does not bruise/bleed easily.   Musculoskeletal: Negative for falls and muscle weakness.   Neurological: See HPI. No seizures.  "  Endocrine: Negative for polydipsia, polyphagia and polyuria.   Allergic/Immunologic: Negative for hives and persistent infections.     EXAM:  BP (!) 140/84 (BP Location: Left arm, Patient Position: Sitting, BP Method: Large (Automatic))   Pulse 86   Ht 5' 8" (1.727 m)   Wt 123.9 kg (273 lb 4.2 oz)   BMI 41.55 kg/m²     General: The patient is a very pleasant 68 y.o. female in no apparent distress, the patient is oriented to person, place and time.  Psych: Normal mood and affect  HEENT: Vision grossly intact, hearing intact to the spoken word.  Lungs: Respirations unlabored.  Gait: Antalgic station and gait, she ambulates with a cane.   Skin: Dorsal lumbar skin negative for rashes, lesions, hairy patches and surgical scars. There is mild lumbar tenderness to palpation.  Range of motion: Lumbar range of motion is acceptable.  Spinal Balance: Global saggital and coronal spinal balance acceptable, not significant for scoliosis and kyphosis.  Musculoskeletal: No pain with the range of motion of the bilateral hips. No trochanteric tenderness to palpation.  Vascular: Bilateral lower extremities warm and well perfused, dorsalis pedis pulses 2+ bilaterally.  Neurological: Normal strength and tone in all major motor groups in the bilateral lower extremities. Normal sensation to light touch in the L2-S1 dermatomes bilaterally.  Deep tendon reflexes symmetric 2+ in the bilateral lower extremities.  Negative Babinski bilaterally. Straight leg raise positive bilaterally, reproduces back pain.    IMAGING:      Today I personally reviewed AP, Lat and Flex/Ex  upright L-spine films that demonstrate moderate degenerative changes with L4/5 anterolisthesis.    MRI lumbar spine demonstrates severe L2/3 and moderate L4/5 stenosis.       Body mass index is 41.55 kg/m².    No results found for: HGBA1C        ASSESSMENT/PLAN:    Diagnoses and all orders for this visit:    Spinal stenosis of lumbar region with neurogenic " claudication    DDD (degenerative disc disease), lumbar    All treatment options discussed.  I think the patient would benefit from an BONNIE, however her last A1c was 7.8.  I will reach out to her PCP to get approval before ordering the BONNIE.     Follow-up if symptoms worsen or fail to improve.

## 2018-11-02 ENCOUNTER — TELEPHONE (OUTPATIENT)
Dept: ORTHOPEDICS | Facility: CLINIC | Age: 68
End: 2018-11-02

## 2018-11-02 NOTE — TELEPHONE ENCOUNTER
Notified patient that her PCP stated that she can have the injection. Patient verbally understand.

## 2018-11-02 NOTE — TELEPHONE ENCOUNTER
----- Message from Maggy Jalloh sent at 11/2/2018  9:06 AM CDT -----  Contact: Miriam(From [facility]   Miriam is needing a call back regarding pt, miriam can be reached @ [phone]

## 2018-11-02 NOTE — TELEPHONE ENCOUNTER
----- Message from Ledy Campa PA-C sent at 11/2/2018  9:33 AM CDT -----  Contact: Katey(From [facility]   Will you please call the patient and tell her her PCP said it is fine for her to have the injection.  I will put the orders in and they will call her to schedule?      ----- Message -----  From: Danica Cowan MA  Sent: 11/2/2018   9:17 AM  To: Ledy Campa PA-C    Nurse katey called and stated, Dr.Scott Bassett said it is okay for patient to have the injection  ----- Message -----  From: Maggy Jalloh  Sent: 11/2/2018   9:06 AM  To: Lokes[facility] Villafana Staff    Katey is needing a call back regarding pt, katey can be reached @ [phone]

## 2018-11-02 NOTE — TELEPHONE ENCOUNTER
Katey from [facility] stated that Dr. watson said that  it is okay for patient to have injection. Notified Ledy Campa.

## 2018-11-09 ENCOUNTER — HOSPITAL ENCOUNTER (OUTPATIENT)
Facility: HOSPITAL | Age: 68
Discharge: HOME | End: 2018-11-09
Attending: ANESTHESIOLOGY | Admitting: ANESTHESIOLOGY
Payer: MEDICARE

## 2018-11-09 VITALS
HEART RATE: 69 BPM | HEIGHT: 68 IN | RESPIRATION RATE: 18 BRPM | DIASTOLIC BLOOD PRESSURE: 92 MMHG | TEMPERATURE: 96 F | SYSTOLIC BLOOD PRESSURE: 140 MMHG | OXYGEN SATURATION: 97 % | WEIGHT: 270 LBS | BODY MASS INDEX: 40.92 KG/M2

## 2018-11-09 DIAGNOSIS — M54.16 LUMBAR RADICULOPATHY: ICD-10-CM

## 2018-11-09 DIAGNOSIS — M51.36 DDD (DEGENERATIVE DISC DISEASE), LUMBAR: Primary | ICD-10-CM

## 2018-11-09 LAB — POCT GLUCOSE: 143 MG/DL (ref 70–110)

## 2018-11-09 PROCEDURE — 63600175 PHARM REV CODE 636 W HCPCS: Performed by: ANESTHESIOLOGY

## 2018-11-09 PROCEDURE — 99152 MOD SED SAME PHYS/QHP 5/>YRS: CPT | Mod: ,,, | Performed by: ANESTHESIOLOGY

## 2018-11-09 PROCEDURE — 25500020 PHARM REV CODE 255: Performed by: ANESTHESIOLOGY

## 2018-11-09 PROCEDURE — 25000003 PHARM REV CODE 250: Performed by: ANESTHESIOLOGY

## 2018-11-09 PROCEDURE — 99152 MOD SED SAME PHYS/QHP 5/>YRS: CPT | Performed by: ANESTHESIOLOGY

## 2018-11-09 PROCEDURE — 64483 NJX AA&/STRD TFRM EPI L/S 1: CPT | Mod: 50,,, | Performed by: ANESTHESIOLOGY

## 2018-11-09 PROCEDURE — 82962 GLUCOSE BLOOD TEST: CPT

## 2018-11-09 PROCEDURE — 64483 NJX AA&/STRD TFRM EPI L/S 1: CPT

## 2018-11-09 RX ORDER — MIDAZOLAM HYDROCHLORIDE 2 MG/2ML
INJECTION, SOLUTION INTRAMUSCULAR; INTRAVENOUS
Status: DISCONTINUED | OUTPATIENT
Start: 2018-11-09 | End: 2018-11-09 | Stop reason: HOSPADM

## 2018-11-09 RX ORDER — LIDOCAINE HYDROCHLORIDE 10 MG/ML
INJECTION INFILTRATION; PERINEURAL
Status: DISCONTINUED | OUTPATIENT
Start: 2018-11-09 | End: 2018-11-09 | Stop reason: HOSPADM

## 2018-11-09 RX ORDER — METHYLPREDNISOLONE ACETATE 40 MG/ML
INJECTION, SUSPENSION INTRA-ARTICULAR; INTRALESIONAL; INTRAMUSCULAR; SOFT TISSUE
Status: DISCONTINUED | OUTPATIENT
Start: 2018-11-09 | End: 2018-11-09 | Stop reason: HOSPADM

## 2018-11-09 RX ORDER — SODIUM CHLORIDE 9 MG/ML
500 INJECTION, SOLUTION INTRAVENOUS CONTINUOUS
Status: DISCONTINUED | OUTPATIENT
Start: 2018-11-09 | End: 2018-11-09 | Stop reason: HOSPADM

## 2018-11-09 RX ORDER — FENTANYL CITRATE 50 UG/ML
INJECTION, SOLUTION INTRAMUSCULAR; INTRAVENOUS
Status: DISCONTINUED | OUTPATIENT
Start: 2018-11-09 | End: 2018-11-09 | Stop reason: HOSPADM

## 2018-11-09 NOTE — PROGRESS NOTES
Patient discharged per MD orders. Instructions given on medications, wound care, activity, signs of infection, when to call MD, and follow up appointments. Pt verbalized understanding.  Patient AAOx3, VSS, no c/o pain or discomfort at this time. PIV removed. Patient left unit via wheelchair with transport and family.

## 2018-11-09 NOTE — DISCHARGE INSTRUCTIONS

## 2018-11-09 NOTE — OP NOTE
Patient Name: Ena Miller  MRN: [id number]    INFORMED CONSENT: The procedure, risks, benefits and options were discussed with patient. There are no contraindications to the procedure. The patient expressed understanding and agreed to proceed. The personnel performing the procedure was discussed. I verify that I personally obtained the patient's consent prior to the start of the procedure and the signed consent can be found on the patient's chart.    PROCEDURE: Bilateral L4/5 TRANSFORAMINAL EPIDURAL STEROID INJECTION    Procedure Date: 11/9/2018  Surgeon(s) and Role:     * Carlos Martinez III, MD - Primary  Anesthesia: RN IV Sedation  Procedure(s) (LRB):  INJECTION, STEROID, EPIDURAL, TRANSFORAMINAL APPROACH (Bilateral)    Pre Procedure diagnosis:  1. DDD (degenerative disc disease), lumbar    2. Lumbar radiculopathy          Post-Procedure diagnosis: SAME    Sedation: Yes - Fentanyl 75 mcg and Midazolam 1 mg    DESCRIPTION OF PROCEDURE: The patient was brought to the procedure room. IV access was obtained prior to the procedure. The patient was positioned prone on the fluoroscopy table. Continuous hemodynamic monitoring was initiated including blood pressure, EKG, and pulse oximetry.  The skin was prepped with chlorhexidine three times and draped in a sterile fashion. Skin anesthesia was achieved using 5 mL of lidocaine 1% over the respective injection sites.     An oblique fluoroscopic view was obtained, with the superior articular process of the inferior vertebral body aligned with the pedicle. The tip of a 22-gauge 5-inch Quincke-type spinal needle was advanced toward the 6 oclock position of the pedicle under intermittent fluoroscopic guidance. Confirmation of proper needle position was made with AP, oblique, and lateral fluoroscopic views. Negative aspiration for blood or CSF was confirmed. 0.5 mL of Omnipaque 300 was injected at each side. Live fluoroscopic imaging revealed a clear outline of the  spinal nerve with proximal spread of agent through the neural foramen into the anterior epidural space. A total combination of 1.5 mL of Lidocaine 1% and 30 mg Depo-Medrol was injected at each side. The needles were removed and bleeding was nil.  A sterile dressing was applied. Ena was taken back to the recovery room for further observation.     Blood Loss: Nil

## 2018-11-09 NOTE — DISCHARGE SUMMARY
Discharge Note  Short Stay      SUMMARY     Admit Date: 11/9/2018    Attending Physician: Carlos Martinez III      Discharge Physician: Carlos Martinez III      Discharge Date: 11/9/2018     Final Diagnosis:   1. DDD (degenerative disc disease), lumbar    2. Lumbar radiculopathy        Disposition: Home or self care    Patient Instructions:   Current Discharge Medication List      CONTINUE these medications which have NOT CHANGED    Details   atorvastatin (LIPITOR) 80 MG tablet 80 mg once daily.       diltiazem (CARDIZEM) 120 MG tablet 120 mg every 12 (twelve) hours.       gabapentin (NEURONTIN) 300 MG capsule       hydrALAZINE (APRESOLINE) 50 MG tablet 50 mg every 8 (eight) hours.       isosorbide dinitrate (ISORDIL) 30 MG Tab 30 mg 2 (two) times daily.       KLOR-CON M20 20 mEq tablet 20 mEq 2 (two) times daily.       losartan (COZAAR) 100 MG tablet 100 mg once daily.       metFORMIN (GLUMETZA) 1000 MG (MOD) 24 hr tablet       polymyxin B sulf-trimethoprim (POLYTRIM) 10,000 unit- 1 mg/mL Drop       tramadol (ULTRAM) 50 mg tablet 50 mg as needed.       triamterene-hydrochlorothiazide 37.5-25 mg (MAXZIDE-25) 37.5-25 mg per tablet 1 tablet once daily.       hydrocortisone 2.5 % cream Apply topically 2 (two) times daily. To affected areas of eyelids; do not get in eyes  Qty: 30 g, Refills: 1    Associated Diagnoses: Eczematous dermatitis of eyelids of both eyes      polyethylene glycol (GLYCOLAX) 17 gram/dose powder 17 g once daily.       triamcinolone acetonide 0.1% (KENALOG) 0.1 % cream AAA bid  Qty: 454 g, Refills: 3    Associated Diagnoses: PLC (pityriasis lichenoides chronica)                 Discharge Diagnosis: Same as Pre and Post Procedure  Condition on Discharge: Stable.  Diet on Discharge: Same as before.  Activity: as per instruction sheet.  Discharge to: Home with a responsible adult.  Follow up: as needed

## 2018-11-09 NOTE — PLAN OF CARE
Problem: Patient Care Overview  Goal: Plan of Care Review  Outcome: Ongoing (interventions implemented as appropriate)  Report received from RANDOLPH Quach. Patient s/p low back injection. bandaids x2 cdi. No complaints from patient. Call light in reach. Will monitor.

## 2018-11-09 NOTE — DISCHARGE SUMMARY
Discharge Note  Short Stay      SUMMARY     Admit Date: 11/9/2018    Attending Physician: Carlos Martinez III      Discharge Physician: aCrlos Martinez III      Discharge Date: 11/9/2018 3:16 PM    Final Diagnosis:   1. DDD (degenerative disc disease), lumbar    2. Lumbar radiculopathy        Disposition: Home or self care    Patient Instructions:   Current Discharge Medication List      CONTINUE these medications which have NOT CHANGED    Details   atorvastatin (LIPITOR) 80 MG tablet 80 mg once daily.       diltiazem (CARDIZEM) 120 MG tablet 120 mg every 12 (twelve) hours.       gabapentin (NEURONTIN) 300 MG capsule       hydrALAZINE (APRESOLINE) 50 MG tablet 50 mg every 8 (eight) hours.       isosorbide dinitrate (ISORDIL) 30 MG Tab 30 mg 2 (two) times daily.       KLOR-CON M20 20 mEq tablet 20 mEq 2 (two) times daily.       losartan (COZAAR) 100 MG tablet 100 mg once daily.       metFORMIN (GLUMETZA) 1000 MG (MOD) 24 hr tablet       polymyxin B sulf-trimethoprim (POLYTRIM) 10,000 unit- 1 mg/mL Drop       tramadol (ULTRAM) 50 mg tablet 50 mg as needed.       triamterene-hydrochlorothiazide 37.5-25 mg (MAXZIDE-25) 37.5-25 mg per tablet 1 tablet once daily.       hydrocortisone 2.5 % cream Apply topically 2 (two) times daily. To affected areas of eyelids; do not get in eyes  Qty: 30 g, Refills: 1    Associated Diagnoses: Eczematous dermatitis of eyelids of both eyes      polyethylene glycol (GLYCOLAX) 17 gram/dose powder 17 g once daily.       triamcinolone acetonide 0.1% (KENALOG) 0.1 % cream AAA bid  Qty: 454 g, Refills: 3    Associated Diagnoses: PLC (pityriasis lichenoides chronica)                 Discharge Diagnosis: Same as Pre and Post Procedure  Condition on Discharge: Stable.  Diet on Discharge: Same as before.  Activity: as per instruction sheet.  Discharge to: Home with a responsible adult.  Follow up: as needed

## 2018-12-31 ENCOUNTER — TELEPHONE (OUTPATIENT)
Dept: ORTHOPEDICS | Facility: CLINIC | Age: 68
End: 2018-12-31

## 2018-12-31 NOTE — TELEPHONE ENCOUNTER
----- Message from Gloria Tello sent at 12/31/2018 10:24 AM CST -----  Contact: Self  Needs Advice    Reason for call: Patient requesting to speak with Ledy. Says she has some questions about her back.        Communication Preference: [phone]    Additional Information:

## 2019-01-04 ENCOUNTER — TELEPHONE (OUTPATIENT)
Dept: PAIN MEDICINE | Facility: CLINIC | Age: 69
End: 2019-01-04

## 2019-01-04 NOTE — TELEPHONE ENCOUNTER
Nurse spoke with pt regarding her pain. Nurse ask the pt would she like to come in for office visit  Pt declined and sated she has an appt with ortho on Tuesday she will call me back after that appt.   ----- Message from Isabel Agudelo MA sent at 1/4/2019  3:25 PM CST -----  Contact: Self      ----- Message -----  From: Aicha Kwon  Sent: 1/4/2019   3:21 PM  To: Michelle HALEY Staff    Pt is calling to speak with Staff regarding her pain.    She can be reached at [phone].    Thank you.

## 2019-01-08 ENCOUNTER — OFFICE VISIT (OUTPATIENT)
Dept: ORTHOPEDICS | Facility: CLINIC | Age: 69
End: 2019-01-08
Payer: MEDICARE

## 2019-01-08 VITALS — WEIGHT: 272.81 LBS | BODY MASS INDEX: 41.34 KG/M2 | HEIGHT: 68 IN

## 2019-01-08 DIAGNOSIS — M51.36 DDD (DEGENERATIVE DISC DISEASE), LUMBAR: ICD-10-CM

## 2019-01-08 DIAGNOSIS — M48.062 SPINAL STENOSIS OF LUMBAR REGION WITH NEUROGENIC CLAUDICATION: Primary | ICD-10-CM

## 2019-01-08 PROCEDURE — 99213 PR OFFICE/OUTPT VISIT, EST, LEVL III, 20-29 MIN: ICD-10-PCS | Mod: S$PBB,,, | Performed by: PHYSICIAN ASSISTANT

## 2019-01-08 PROCEDURE — 99999 PR PBB SHADOW E&M-EST. PATIENT-LVL III: CPT | Mod: PBBFAC,,, | Performed by: PHYSICIAN ASSISTANT

## 2019-01-08 PROCEDURE — 99999 PR PBB SHADOW E&M-EST. PATIENT-LVL III: ICD-10-PCS | Mod: PBBFAC,,, | Performed by: PHYSICIAN ASSISTANT

## 2019-01-08 PROCEDURE — 99213 OFFICE O/P EST LOW 20 MIN: CPT | Mod: PBBFAC | Performed by: PHYSICIAN ASSISTANT

## 2019-01-08 PROCEDURE — 99213 OFFICE O/P EST LOW 20 MIN: CPT | Mod: S$PBB,,, | Performed by: PHYSICIAN ASSISTANT

## 2019-01-08 NOTE — PROGRESS NOTES
"DATE: 1/8/2019  PATIENT: Ena Miller    Attending Physician: Rashard Phelan M.D.    HISTORY:  Ena Miller is a 68 y.o. female who returns to me today for follow up of spinal stenosis.  She was last seen by me 10/30/2018.  Today she is doing well but notes she is having right anterior leg pain that is really bothering her and affecting her daily activities.  She says the pain in the posterior leg is somewhat improved and more manageable now.     The Patient denies myelopathic symptoms such as handwriting changes or difficulty with buttons/coins/keys. Denies perineal paresthesias, bowel/bladder dysfunction.    PMH/PSH/FamHx/SocHx:  Unchanged from prior visit    ROS:  REVIEW OF SYSTEMS:  Constitution: Negative. Negative for chills, fever and night sweats.   HENT: Negative for congestion and headaches.    Eyes: Negative for blurred vision, left vision loss and right vision loss.   Cardiovascular: Negative for chest pain and syncope.   Respiratory: Negative for cough and shortness of breath.    Endocrine: Negative for polydipsia, polyphagia and polyuria.   Hematologic/Lymphatic: Negative for bleeding problem. Does not bruise/bleed easily.   Skin: Negative for dry skin, itching and rash.   Musculoskeletal: Negative for falls and muscle weakness.   Gastrointestinal: Negative for abdominal pain and bowel incontinence.   Allergic/Immunologic: Negative for hives and persistent infections.  Genitourinary: Negative for urinary retention/incontinence and nocturia.   Neurological: Negative for disturbances in coordination, no myelopathic symptoms such as handwriting changes or difficulty with buttons, coins, keys or small objects. No loss of balance and seizures.   Psychiatric/Behavioral: Negative for depression. The patient does not have insomnia.   Denies perineal paresthesias, bowel or bladder incontinence    EXAM:  Ht 5' 8" (1.727 m)   Wt 123.8 kg (272 lb 13.1 oz)   BMI 41.48 kg/m²     Physical exam stable. Neuro " exam stable.       IMAGING:  No new imaging today.    Today I personally re- reviewed AP, Lat and Flex/Ex  upright L-spine that demonstrate moderate degenerative changes with L4/5 anterolisthesis.    MRI lumbar spine shows severe L2/3 stenosis and L4/5 stenosis.     Body mass index is 41.48 kg/m².    No results found for: HGBA1C      ASSESSMENT/PLAN:    Diagnoses and all orders for this visit:    Spinal stenosis of lumbar region with neurogenic claudication    DDD (degenerative disc disease), lumbar      Today we discussed options.  The patient is not interested in surgical intervention at this time.  I would like to try a right L2/3 TFESI as the patient is now having more pain in the right anterior thigh.  She would like to think about this.  She will let me know what she decides.       Follow-up if symptoms worsen or fail to improve.

## 2019-01-23 ENCOUNTER — TELEPHONE (OUTPATIENT)
Dept: PAIN MEDICINE | Facility: CLINIC | Age: 69
End: 2019-01-23

## 2019-01-23 NOTE — TELEPHONE ENCOUNTER
Nurse spoke wit pt regarding getting an injection. Nurse explain to the pt that she will need to received an ok or order from jia to schedule her for an injection. Pt verbalized understanding.

## 2019-01-25 ENCOUNTER — TELEPHONE (OUTPATIENT)
Dept: PAIN MEDICINE | Facility: CLINIC | Age: 69
End: 2019-01-25

## 2019-01-25 NOTE — TELEPHONE ENCOUNTER
Nurse spoke with pt to schedule/ conform procedure with Dr. Martinez on 2/1/19 and go over pre-op instructions. Nurse also mailed out instruction due to pt no being active on pt portal. Pt verbalized understanding.

## 2019-02-01 ENCOUNTER — HOSPITAL ENCOUNTER (OUTPATIENT)
Facility: HOSPITAL | Age: 69
Discharge: HOME | End: 2019-02-01
Attending: ANESTHESIOLOGY | Admitting: ANESTHESIOLOGY
Payer: MEDICARE

## 2019-02-01 VITALS
SYSTOLIC BLOOD PRESSURE: 171 MMHG | DIASTOLIC BLOOD PRESSURE: 88 MMHG | HEART RATE: 63 BPM | OXYGEN SATURATION: 97 % | RESPIRATION RATE: 20 BRPM | TEMPERATURE: 98 F

## 2019-02-01 DIAGNOSIS — M54.16 LUMBAR RADICULOPATHY: ICD-10-CM

## 2019-02-01 DIAGNOSIS — M51.36 DDD (DEGENERATIVE DISC DISEASE), LUMBAR: Primary | ICD-10-CM

## 2019-02-01 LAB — POCT GLUCOSE: 203 MG/DL (ref 70–110)

## 2019-02-01 PROCEDURE — 25000003 PHARM REV CODE 250: Performed by: ANESTHESIOLOGY

## 2019-02-01 PROCEDURE — 64483 NJX AA&/STRD TFRM EPI L/S 1: CPT | Mod: RT | Performed by: ANESTHESIOLOGY

## 2019-02-01 PROCEDURE — 99152 MOD SED SAME PHYS/QHP 5/>YRS: CPT | Mod: ,,, | Performed by: ANESTHESIOLOGY

## 2019-02-01 PROCEDURE — 82962 GLUCOSE BLOOD TEST: CPT

## 2019-02-01 PROCEDURE — 25500020 PHARM REV CODE 255: Performed by: ANESTHESIOLOGY

## 2019-02-01 PROCEDURE — 63600175 PHARM REV CODE 636 W HCPCS: Performed by: ANESTHESIOLOGY

## 2019-02-01 PROCEDURE — 99152 PR MOD CONSCIOUS SEDATION, SAME PHYS, 5+ YRS, FIRST 15 MIN: ICD-10-PCS | Mod: ,,, | Performed by: ANESTHESIOLOGY

## 2019-02-01 PROCEDURE — 64483 NJX AA&/STRD TFRM EPI L/S 1: CPT | Mod: RT,,, | Performed by: ANESTHESIOLOGY

## 2019-02-01 PROCEDURE — 64483 PR EPIDURAL INJ, ANES/STEROID, TRANSFORAMINAL, LUMB/SACR, SNGL LEVL: ICD-10-PCS | Mod: RT,,, | Performed by: ANESTHESIOLOGY

## 2019-02-01 PROCEDURE — 27000221 HC OXYGEN, UP TO 24 HOURS

## 2019-02-01 RX ORDER — DEXAMETHASONE SODIUM PHOSPHATE 100 MG/10ML
INJECTION INTRAMUSCULAR; INTRAVENOUS
Status: DISCONTINUED | OUTPATIENT
Start: 2019-02-01 | End: 2019-02-01 | Stop reason: HOSPADM

## 2019-02-01 RX ORDER — LIDOCAINE HYDROCHLORIDE 5 MG/ML
INJECTION, SOLUTION INFILTRATION; INTRAVENOUS
Status: DISCONTINUED | OUTPATIENT
Start: 2019-02-01 | End: 2019-02-01 | Stop reason: HOSPADM

## 2019-02-01 RX ORDER — FENTANYL CITRATE 50 UG/ML
INJECTION, SOLUTION INTRAMUSCULAR; INTRAVENOUS
Status: DISCONTINUED | OUTPATIENT
Start: 2019-02-01 | End: 2019-02-01 | Stop reason: HOSPADM

## 2019-02-01 RX ORDER — LIDOCAINE HYDROCHLORIDE 10 MG/ML
INJECTION INFILTRATION; PERINEURAL
Status: DISCONTINUED | OUTPATIENT
Start: 2019-02-01 | End: 2019-02-01 | Stop reason: HOSPADM

## 2019-02-01 RX ORDER — GABAPENTIN 300 MG/1
300 CAPSULE ORAL ONCE
Status: COMPLETED | OUTPATIENT
Start: 2019-02-01 | End: 2019-02-01

## 2019-02-01 RX ORDER — MIDAZOLAM HYDROCHLORIDE 1 MG/ML
INJECTION, SOLUTION INTRAMUSCULAR; INTRAVENOUS
Status: DISCONTINUED | OUTPATIENT
Start: 2019-02-01 | End: 2019-02-01 | Stop reason: HOSPADM

## 2019-02-01 RX ORDER — SODIUM CHLORIDE 9 MG/ML
500 INJECTION, SOLUTION INTRAVENOUS CONTINUOUS
Status: DISCONTINUED | OUTPATIENT
Start: 2019-02-01 | End: 2019-02-01 | Stop reason: HOSPADM

## 2019-02-01 RX ADMIN — GABAPENTIN 300 MG: 300 CAPSULE ORAL at 11:02

## 2019-02-01 NOTE — DISCHARGE INSTRUCTIONS

## 2019-02-01 NOTE — PLAN OF CARE
Problem: Adult Inpatient Plan of Care  Goal: Plan of Care Review  Outcome: Ongoing (interventions implemented as appropriate)  Received report from RANDOLPH almendarez. Patient s/p injection, AAOx3. VSS, no c/o pain or discomfort at this time, resp even and unlabored. bandaid dressing to low back is CDI. No active bleeding. No hematoma noted. Post procedure protocol reviewed with patient and patient's family. Understanding verbalized. Family members at bedside. Nurse call bell within reach. Will continue to monitor per post procedure protocol.

## 2019-02-01 NOTE — OP NOTE
Patient Name: Ena Miller  MRN: [id number]    INFORMED CONSENT: The procedure, risks, benefits and options were discussed with patient. There are no contraindications to the procedure. The patient expressed understanding and agreed to proceed. The personnel performing the procedure was discussed. I verify that I personally obtained the patient's consent prior to the start of the procedure and the signed consent can be found on the patient's chart.    PROCEDURE: Right L2/3 TRANSFORAMINAL EPIDURAL STEROID INJECTION    Procedure Date: 2/1/2019  Surgeon(s) and Role:     * Carlos Martinez III, MD - Primary  Anesthesia: RN IV Sedation    Pre Procedure diagnosis:  1. DDD (degenerative disc disease), lumbar    2. Lumbar radiculopathy          Post-Procedure diagnosis: SAME    Sedation: Yes - Fentanyl 50 mcg and Midazolam 1 mg    DESCRIPTION OF PROCEDURE: The patient was brought to the procedure room. IV access was obtained prior to the procedure. The patient was positioned prone on the fluoroscopy table. Continuous hemodynamic monitoring was initiated including blood pressure, EKG, and pulse oximetry.  The skin was prepped with chlorhexidine three times and draped in a sterile fashion. Skin anesthesia was achieved using 5 mL of lidocaine 1% over the respective injection sites.     An oblique fluoroscopic view was obtained, with the superior articular process of the inferior vertebral body aligned with the pedicle. The tip of a 22-gauge 5-inch Quincke-type spinal needle was advanced toward the 6 oclock position of the pedicle under intermittent fluoroscopic guidance. Confirmation of proper needle position was made with AP, oblique, and lateral fluoroscopic views. Negative aspiration for blood or CSF was confirmed. 0.5 mL of Omnipaque 300 was injected. Live fluoroscopic imaging revealed a clear outline of the spinal nerve with proximal spread of agent through the neural foramen into the anterior epidural space. A  total combination of 1.5 mL of Lidocaine 0.5% and 10 mg Decadron was injected. The needle was removed and bleeding was nil.  A sterile dressing was applied. Ena was taken back to the recovery room for further observation.      Blood Loss: Nil

## 2019-02-01 NOTE — DISCHARGE SUMMARY
Discharge Note  Short Stay      SUMMARY     Admit Date: 2/1/2019    Attending Physician: Carlos Martinez III      Discharge Physician: Carlos Martinez III      Discharge Date: 2/1/2019 12:32 PM    Final Diagnosis:   1. DDD (degenerative disc disease), lumbar    2. Lumbar radiculopathy        Disposition: Home or self care    Patient Instructions:   Current Discharge Medication List      CONTINUE these medications which have NOT CHANGED    Details   diltiazem (CARDIZEM) 120 MG tablet 120 mg every 12 (twelve) hours.       gabapentin (NEURONTIN) 300 MG capsule       hydrALAZINE (APRESOLINE) 50 MG tablet 50 mg every 8 (eight) hours.       isosorbide dinitrate (ISORDIL) 30 MG Tab 30 mg 2 (two) times daily.       KLOR-CON M20 20 mEq tablet 20 mEq 2 (two) times daily.       losartan (COZAAR) 100 MG tablet 100 mg once daily.       tramadol (ULTRAM) 50 mg tablet 50 mg as needed.       triamterene-hydrochlorothiazide 37.5-25 mg (MAXZIDE-25) 37.5-25 mg per tablet 1 tablet once daily.       atorvastatin (LIPITOR) 80 MG tablet 80 mg once daily.       hydrocortisone 2.5 % cream Apply topically 2 (two) times daily. To affected areas of eyelids; do not get in eyes  Qty: 30 g, Refills: 1    Associated Diagnoses: Eczematous dermatitis of eyelids of both eyes      metFORMIN (GLUMETZA) 1000 MG (MOD) 24 hr tablet       polyethylene glycol (GLYCOLAX) 17 gram/dose powder 17 g once daily.       polymyxin B sulf-trimethoprim (POLYTRIM) 10,000 unit- 1 mg/mL Drop       triamcinolone acetonide 0.1% (KENALOG) 0.1 % cream AAA bid  Qty: 454 g, Refills: 3    Associated Diagnoses: PLC (pityriasis lichenoides chronica)                 Discharge Diagnosis: Same as Pre and Post Procedure  Condition on Discharge: Stable.  Diet on Discharge: Same as before.  Activity: as per instruction sheet.  Discharge to: Home with a responsible adult.  Follow up: as needed

## 2019-02-15 ENCOUNTER — TELEPHONE (OUTPATIENT)
Dept: PAIN MEDICINE | Facility: CLINIC | Age: 69
End: 2019-02-15

## 2019-02-15 NOTE — TELEPHONE ENCOUNTER
Nurse returned pt called regarding her pain and advised her that she will need to follow with  because she was the referring MD and may want to assess her.

## 2019-02-18 ENCOUNTER — TELEPHONE (OUTPATIENT)
Dept: ORTHOPEDICS | Facility: CLINIC | Age: 69
End: 2019-02-18

## 2019-02-18 NOTE — TELEPHONE ENCOUNTER
Called the patient and there was no answer was calling to follow up with her  Injection  She had the other day , I left a voicemail and a call back number

## 2019-02-18 NOTE — TELEPHONE ENCOUNTER
----- Message from Ledy Campa PA-C sent at 2/18/2019 10:10 AM CST -----  Contact: PT  Can you call and see how she is doing?  If she didn't have relief with the injection, I would like to see her in clinic.      ----- Message -----  From: Daryl Horta MA  Sent: 2/15/2019   2:03 PM  To: Ledy Campa PA-C        ----- Message -----  From: Ledy Howard  Sent: 2/15/2019   1:55 PM  To: Lokesh Villafana Staff    PT is calling requesting a nurse to call her back regarding injections    Callback: [phone]

## 2022-02-18 ENCOUNTER — TELEPHONE (OUTPATIENT)
Dept: PAIN MEDICINE | Facility: CLINIC | Age: 72
End: 2022-02-18
Payer: MEDICARE

## 2022-02-18 NOTE — TELEPHONE ENCOUNTER
.This message is for patient in regards to his/her appointment 02/21/22 at 03:00p       [facility] Policy: For the safety of all patients and staff members.     Patient Visitor policy: Due to social distancing and limited seating staff are requesting patient to arrive to their schedule appointments on time. During this visit we are asking all patients to only have one visitor over the age of 18yrs old to accompany them to be seen by Dr. Payal Erickson. If patient do not required assistance with their visit, we're asking all visitors to remain outside the waiting area.    Upon arriving to your schedule appointment; patients are required to wear a face mask, if patient do not have a face mask one will be provided entering the facility. We ask patients to contact clinic at this number [phone] to notify staff that they have arrived or they may do so by utilizing the Mobile checked in Lien(if patient have patient portal; clinical staff will send a message through there letting them know it's okay to proceed to their visit). If you have any questions or concerns please contact [phone]       also inquired IPM within message.    [facility] providers and Mid-levels offer interventional, procedure--based options to treat chronic pain. The goal is to manage chronic pain by reducing pain frequency and intensity and address your functional goals for activities of daily living while simultaneously reducing or eliminating your reliance on medications. Please bring any records or images that you have from prior treatments for your pain. You will be presented with multi-modal treatment plan that may or may not include imaging, interventional procedures, physical/occupational/aqua therapy, pain creams, and non-narcotic pain medications used for the treatments of chronic pain.      Patient verbalized understanding

## 2023-07-31 ENCOUNTER — TELEPHONE (OUTPATIENT)
Dept: PAIN MEDICINE | Facility: CLINIC | Age: 73
End: 2023-07-31
Payer: MEDICARE

## 2023-07-31 NOTE — TELEPHONE ENCOUNTER
Patient was rescheduled due to transportation issues. Staff verified. Patient notified.      ----- Message from Geovanna Rivers sent at 7/31/2023  4:44 PM CDT -----  Contact: Patient  Type:  Patient Call          Who Called: patient         Does the patient know what this is regarding?: requesting a call back about her appt on tomorrow ; pt would like to know the next available  appt if her transportation doesn't show up ; please advise           Would the patient rather a call back or a response via [facility]?call           Best Call Back Number: [phone]        Additional Information:

## 2023-08-17 ENCOUNTER — TELEPHONE (OUTPATIENT)
Dept: PAIN MEDICINE | Facility: CLINIC | Age: 73
End: 2023-08-17
Payer: MEDICARE

## 2023-08-17 NOTE — TELEPHONE ENCOUNTER
----- Message from Georgina Castillo MA sent at 8/17/2023  3:02 PM CDT -----  Contact: PT  Hey can you also contact this pt to  her. I think her insurance won't let me schedule her.     THX  ----- Message -----  From: Yolanda Johnson  Sent: 8/17/2023   9:59 AM CDT  To: Sebastian Washington Staff    .Type:  Sooner Appointment Request    Caller is requesting a sooner appointment.  Caller declined first available appointment listed below.  Caller will not accept being placed on the waitlist and is requesting a message be sent to doctor.  Name of Caller: Ena  When is the first available appointment?12/19/23  Symptoms: back pain  Would the patient rather a call back or a response via [facility]? C/b  Best Call Back Number:.[phone] or.[phone] (home) [phone] (work)    Additional Information: missed appt 08/15/23-due to pain     Thanks    Yolanda MOCTEZUMA

## 2023-08-17 NOTE — TELEPHONE ENCOUNTER
Staff left voice message to notify the patient that there are not any sooner appointment available.

## 2023-08-17 NOTE — TELEPHONE ENCOUNTER
----- Message from Yolanda Johnson sent at 8/17/2023  9:52 AM CDT -----  Contact: PT  .Type:  Sooner Appointment Request    Caller is requesting a sooner appointment.  Caller declined first available appointment listed below.  Caller will not accept being placed on the waitlist and is requesting a message be sent to doctor.  Name of Caller: Ena  When is the first available appointment?12/19/23  Symptoms: back pain  Would the patient rather a call back or a response via [facility]? C/b  Best Call Back Number:.[phone] or.[phone] (home) [phone] (work)    Additional Information: missed appt 08/15/23-due to pain     Thanks    Yolanda MOCTEZUMA

## 2023-09-25 ENCOUNTER — TELEPHONE (OUTPATIENT)
Dept: PAIN MEDICINE | Facility: CLINIC | Age: 73
End: 2023-09-25
Payer: MEDICARE

## 2023-09-25 NOTE — TELEPHONE ENCOUNTER
----- Message from Jaspreet Santana MD sent at 9/24/2023 11:52 AM CDT -----  Pls get patient sooner appointment

## 2023-10-06 ENCOUNTER — OFFICE VISIT (OUTPATIENT)
Dept: PAIN MEDICINE | Facility: CLINIC | Age: 73
End: 2023-10-06
Payer: MEDICARE

## 2023-10-06 VITALS
WEIGHT: 251.75 LBS | HEART RATE: 71 BPM | HEIGHT: 68 IN | DIASTOLIC BLOOD PRESSURE: 72 MMHG | OXYGEN SATURATION: 100 % | RESPIRATION RATE: 18 BRPM | TEMPERATURE: 98 F | SYSTOLIC BLOOD PRESSURE: 117 MMHG | BODY MASS INDEX: 38.15 KG/M2

## 2023-10-06 DIAGNOSIS — M48.062 SPINAL STENOSIS OF LUMBAR REGION WITH NEUROGENIC CLAUDICATION: Primary | ICD-10-CM

## 2023-10-06 DIAGNOSIS — M54.16 LUMBAR RADICULOPATHY: ICD-10-CM

## 2023-10-06 DIAGNOSIS — M51.36 DDD (DEGENERATIVE DISC DISEASE), LUMBAR: ICD-10-CM

## 2023-10-06 PROCEDURE — 99999 PR PBB SHADOW E&M-EST. PATIENT-LVL IV: ICD-10-PCS | Mod: PBBFAC,,, | Performed by: ANESTHESIOLOGY

## 2023-10-06 PROCEDURE — 1125F PR PAIN SEVERITY QUANTIFIED, PAIN PRESENT: ICD-10-PCS | Mod: CPTII,S$GLB,, | Performed by: ANESTHESIOLOGY

## 2023-10-06 PROCEDURE — 99999 PR PBB SHADOW E&M-EST. PATIENT-LVL IV: CPT | Mod: PBBFAC,,, | Performed by: ANESTHESIOLOGY

## 2023-10-06 PROCEDURE — 1101F PT FALLS ASSESS-DOCD LE1/YR: CPT | Mod: CPTII,S$GLB,, | Performed by: ANESTHESIOLOGY

## 2023-10-06 PROCEDURE — 3288F PR FALLS RISK ASSESSMENT DOCUMENTED: ICD-10-PCS | Mod: CPTII,S$GLB,, | Performed by: ANESTHESIOLOGY

## 2023-10-06 PROCEDURE — 1125F AMNT PAIN NOTED PAIN PRSNT: CPT | Mod: CPTII,S$GLB,, | Performed by: ANESTHESIOLOGY

## 2023-10-06 PROCEDURE — 99204 PR OFFICE/OUTPT VISIT, NEW, LEVL IV, 45-59 MIN: ICD-10-PCS | Mod: S$GLB,,, | Performed by: ANESTHESIOLOGY

## 2023-10-06 PROCEDURE — 4010F PR ACE/ARB THEARPY RXD/TAKEN: ICD-10-PCS | Mod: CPTII,S$GLB,, | Performed by: ANESTHESIOLOGY

## 2023-10-06 PROCEDURE — 3074F PR MOST RECENT SYSTOLIC BLOOD PRESSURE < 130 MM HG: ICD-10-PCS | Mod: CPTII,S$GLB,, | Performed by: ANESTHESIOLOGY

## 2023-10-06 PROCEDURE — 1101F PR PT FALLS ASSESS DOC 0-1 FALLS W/OUT INJ PAST YR: ICD-10-PCS | Mod: CPTII,S$GLB,, | Performed by: ANESTHESIOLOGY

## 2023-10-06 PROCEDURE — 3074F SYST BP LT 130 MM HG: CPT | Mod: CPTII,S$GLB,, | Performed by: ANESTHESIOLOGY

## 2023-10-06 PROCEDURE — 3078F DIAST BP <80 MM HG: CPT | Mod: CPTII,S$GLB,, | Performed by: ANESTHESIOLOGY

## 2023-10-06 PROCEDURE — 3288F FALL RISK ASSESSMENT DOCD: CPT | Mod: CPTII,S$GLB,, | Performed by: ANESTHESIOLOGY

## 2023-10-06 PROCEDURE — 99204 OFFICE O/P NEW MOD 45 MIN: CPT | Mod: S$GLB,,, | Performed by: ANESTHESIOLOGY

## 2023-10-06 PROCEDURE — 4010F ACE/ARB THERAPY RXD/TAKEN: CPT | Mod: CPTII,S$GLB,, | Performed by: ANESTHESIOLOGY

## 2023-10-06 PROCEDURE — 1159F PR MEDICATION LIST DOCUMENTED IN MEDICAL RECORD: ICD-10-PCS | Mod: CPTII,S$GLB,, | Performed by: ANESTHESIOLOGY

## 2023-10-06 PROCEDURE — 1159F MED LIST DOCD IN RCRD: CPT | Mod: CPTII,S$GLB,, | Performed by: ANESTHESIOLOGY

## 2023-10-06 PROCEDURE — 3078F PR MOST RECENT DIASTOLIC BLOOD PRESSURE < 80 MM HG: ICD-10-PCS | Mod: CPTII,S$GLB,, | Performed by: ANESTHESIOLOGY

## 2023-10-06 NOTE — PROGRESS NOTES
PCP: Noah Pete, FNP-C    REFERRING PHYSICIAN: Krista Siu MD    CHIEF COMPLAINT: Back and leg pain    Original HISTORY OF PRESENT ILLNESS: Ena Miller presents to the clinic for the evaluation of the above pain. The pain started at a young age after no particular event. More recently, she received epidurals from Dr. Chi with good relief. She is referred back for recurrence of her pain.     Original Pain Description:  The pain is located in the low back and radiates to the legs. The pain is described as aching. Exacerbating factors: Standing and Walking. Mitigating factors sitting. Symptoms interfere with daily activity. The patient feels like symptoms have been worsening. Patient denies bowel incontinence and significant motor weakness. She does note urinary incontinence when trying to stand up.     Original PAIN SCORES:  Best: Pain is 7  Worst: Pain is 10  Current: Pain is 8        10/6/2023    10:53 AM   Last 3 PDI Scores   Pain Disability Index (PDI) 24       INTERVAL HISTORY: (Newest visit at the bottom)   Interval History (Date):       6 weeks of Conservative therapy:  PT: Sept-Oct, 2023 (Must include dates)  Chiro:  HEP:       Treatments / Medications: (Ice/Heat/NSAIDS/APAP/etc):  Gabapentin  Tramadol      Interventional Pain Procedures: (Previous injections)   &  TFESI with Dr. Chi 50% relief for 6 months    Past Medical History:   Diagnosis Date    Anemia     Angina pectoris     Arthritis     Diabetes mellitus type I     Hypertension     Joint pain     Sleep apnea      Past Surgical History:   Procedure Laterality Date    ABCESS DRAINAGE      BLADDER REPAIR      x 2     SECTION      HYSTERECTOMY       Social History     Socioeconomic History    Marital status:    Tobacco Use    Smoking status: Never    Smokeless tobacco: Never   Substance and Sexual Activity    Alcohol use: No    Drug use: No     Family History   Problem Relation Age of Onset    Cancer  Sister         breast    Cancer Sister         stomach       Review of patient's allergies indicates:   Allergen Reactions    Penicillins Shortness Of Breath    Flagyl [metronidazole hcl]        Current Outpatient Medications   Medication Sig    atorvastatin (LIPITOR) 80 MG tablet 80 mg once daily.     diltiazem (CARDIZEM) 120 MG tablet 120 mg every 12 (twelve) hours.     gabapentin (NEURONTIN) 300 MG capsule     hydrALAZINE (APRESOLINE) 50 MG tablet 50 mg every 8 (eight) hours.     hydrocortisone 2.5 % cream Apply topically 2 (two) times daily. To affected areas of eyelids; do not get in eyes    isosorbide dinitrate (ISORDIL) 30 MG Tab 30 mg 2 (two) times daily.     KLOR-CON M20 20 mEq tablet 20 mEq 2 (two) times daily.     losartan (COZAAR) 100 MG tablet 100 mg once daily.     metFORMIN (GLUMETZA) 1000 MG (MOD) 24 hr tablet     polyethylene glycol (GLYCOLAX) 17 gram/dose powder 17 g once daily.     tramadol (ULTRAM) 50 mg tablet 50 mg as needed.     triamterene-hydrochlorothiazide 37.5-25 mg (MAXZIDE-25) 37.5-25 mg per tablet 1 tablet once daily.     polymyxin B sulf-trimethoprim (POLYTRIM) 10,000 unit- 1 mg/mL Drop     triamcinolone acetonide 0.1% (KENALOG) 0.1 % cream AAA bid (Patient not taking: Reported on 10/6/2023)     No current facility-administered medications for this visit.       ROS:  GENERAL: No fever. No chills. No fatigue. Denies weight loss. Denies weight gain.  HEENT: Denies headaches. Denies vision change. Denies eye pain. Denies double vision. Denies ear pain.   CV: Denies chest pain.   PULM: Denies of shortness of breath.  GI: Denies constipation. No diarrhea. No abdominal pain. Denies nausea. Denies vomiting. No blood in stool.  HEME: Denies bleeding problems.  : Denies urgency. No painful urination. No blood in urine.  MS: Denies joint stiffness. Denies joint swelling.  Denies back pain.  SKIN: Denies rash.   NEURO: Denies seizures. No weakness.  PSYCH:  Denies difficulty sleeping. No  "anxiety. Denies depression. No suicidal thoughts.       VITALS:   Vitals:    10/06/23 1055   BP: 117/72   Pulse: 71   Resp: 18   Temp: 98.2 °F (36.8 °C)   SpO2: 100%   Weight: 114.2 kg (251 lb 12.3 oz)   Height: 5' 8" (1.727 m)   PainSc:   8   PainLoc: Back         PHYSICAL EXAM:   GENERAL: Well appearing, in no acute distress, alert and oriented x3.  PSYCH:  Mood and affect appropriate.  SKIN: Skin color, texture, turgor normal, no rashes or lesions.  HEENT:  Normocephalic, atraumatic. Cranial nerves grossly intact.  NECK: No pain to palpation over the cervical paraspinous muscles. No pain to palpation over facets. No pain with neck flexion, extension, or lateral flexion.   PULM: No evidence of respiratory difficulty, symmetric chest rise.  GI:  Non-distended  BACK: Normal range of motion. Leaning forward. No pain to palpation over the spinous processes. No pain to palpation over facet joints. There is no pain with palpation over the sacroiliac joints bilaterally.   EXTREMITIES: No deformities, edema, or skin discoloration.   MUSCULOSKELETAL: Shoulder, hip, and knee provocative maneuvers are negative. No atrophy is noted.  NEURO: Sensation is equal and appropriate bilaterally. Bilateral upper and lower extremity strength is normal and symmetric. Bilateral upper and lower extremity coordination and muscle stretch reflexes are physiologic and symmetric. Plantar response are downgoing. Straight leg raising in the supine position is negative to radicular pain.   GAIT: cane.      LABS:      IMAGING:    MRI LUMBAR SPINE WITHOUT CONTRAST     CLINICAL HISTORY:  chronic lumbar pain; Low back pain     TECHNIQUE:  Multiplanar, multisequence MR images were acquired from the thoracolumbar junction to the sacrum without the administration of contrast.     COMPARISON:  None.     FINDINGS:  Alignment: Normal.     Vertebrae: Postsurgical changes at L4-5 with solid fusion across the disc space and bilateral facet joints.  Endplate " degenerative changes, most pronounced at L2-3.  No diffuse marrow replacement process.  No fracture.     Discs: Multilevel disc desiccation.  Disc height loss at L2-3. Fluid signal within the L2-L3 disc likely degenerative.     Cord: Crowding and clumping of the nerve roots superior to the L2-3 level.  Conus terminates at L2.     Degenerative findings:     T12-L1: Bilateral facet arthropathy without significant spinal canal stenosis or neural foraminal narrowing.     L1-2: Bilateral facet arthropathy resulting in mild right neural foraminal narrowing.     L2-3: Diffuse posterior disc bulge and bilateral facet arthropathy resulting in severe spinal canal stenosis as well as severe right and mild left neural the foraminal narrowing.     L3-4: Posterior disc bulge and bilateral facet arthropathy resulting in moderate spinal canal stenosis.     L4-5: Postsurgical changes with central posterior osteophytosis and facet productive changes resulting in moderate spinal canal stenosis.     L5-S1: Facet arthropathy resulting in mild left neural foraminal narrowing.     Paraspinal muscles & soft tissues: Multiple T2 hyperintense lesions within the kidneys bilaterally measuring up to 1.2 cm, likely representing cysts.  There are scattered colonic diverticula.  Subcutaneous lesion posterior to T12-L2 isointense to fat on all sequences, likely lipoma.     IMPRESSION:      1. Multilevel degenerative changes as detailed above resulting in severe spinal canal stenosis at L2-3 as well as severe right and mild left neural foraminal narrowing at this level.  2. Postsurgical changes at L4-5 with solid fusion across the disc space and bilateral facet joints.  3. Bilateral renal cysts.  4. Diverticulosis.  5. Probable subcutaneous lipoma posterior to T12-L2.  This report was flagged in Epic as abnormal.     Electronically signed by resident: Jeanmarie Mohan  Date:                                            10/04/2018  Time:                                            13:22     Electronically signed by: Ramez Fontana MD  Date:                                            10/04/2018  Time:                                           17:23    ASSESSMENT: 73 y.o. year old female with pain, consistent with:    Encounter Diagnoses   Name Primary?    Spinal stenosis of lumbar region with neurogenic claudication Yes    Lumbar radiculopathy     DDD (degenerative disc disease), lumbar        DISCUSSION: Ena Miller is a pleasant woman who comes to us with low back and leg pain which is worst with standing. Imaging shows severe canal stenosis at L2/3 in 2018. She does report urinary incontinence but it is only with trying to get out of bed.       PLAN:  Reviewed MRI with patient  Continue PT  Continue Gabapentin  Schedule b/l L2/3 TFESI  She declines surgical referral at this time  Discussed red flag findings: falls, incontinence, paralysis  Follow up after injection      I would like to thank Krista Siu MD for the opportunity to assist in the care of this patient. We had a very nice visit and I look forward to continuing their care. Please let me know if I can be of further assistance.     Payal Erickson  10/06/2023

## 2023-10-09 ENCOUNTER — TELEPHONE (OUTPATIENT)
Dept: PAIN MEDICINE | Facility: CLINIC | Age: 73
End: 2023-10-09
Payer: MEDICARE

## 2023-10-09 NOTE — TELEPHONE ENCOUNTER
----- Message from Payal Erickson MD sent at 10/9/2023  4:12 PM CDT -----  Orders placed.   ----- Message -----  From: Martha Atkinson MA  Sent: 10/9/2023   4:00 PM CDT  To: Payal Erickson MD    Good Afternoon  the following pt would like to proceed with the BONNIE. Can you please put the orders in thank you.      Joyce Ravi Staff  Caller: HOMER SPANN [[id number]] (Today,  2:36 PM)  Type: Call Back       Who called: HOMER SPANN [[id number]]       What is the request in detail: Patient is requesting a call back. Pt would like to discuss scheduling her procedure.   Please advise.     Can the clinic reply by [facility]? No       Would the patient rather a call back or a response via [facility]? Call back       Best call back number: [phone] (home) [phone] (work)       Additional Information:

## 2023-10-09 NOTE — TELEPHONE ENCOUNTER
Staff has sent orders to procedure dept.      ----- Message from Payal Erickson MD sent at 10/9/2023  4:12 PM CDT -----  Orders placed.   ----- Message -----  From: Martha Atkinson MA  Sent: 10/9/2023   4:00 PM CDT  To: Payal Erickson MD    Good Afternoon  the following pt would like to proceed with the BONNIE. Can you please put the orders in thank you.      Joyce Ravi Staff  Caller: HOMER SPANN [[id number]] (Today,  2:36 PM)  Type: Call Back       Who called: HOMER SPANN [[id number]]       What is the request in detail: Patient is requesting a call back. Pt would like to discuss scheduling her procedure.   Please advise.     Can the clinic reply by [facility]? No       Would the patient rather a call back or a response via [facility]? Call back       Best call back number: [phone] (home) [phone] (work)       Additional Information:

## 2023-10-09 NOTE — TELEPHONE ENCOUNTER
----- Message from Joyce Ravi sent at 10/9/2023  2:36 PM CDT -----  Contact: HOMER SPANN [[id number]]  Type: Call Back      Who called: HOMER SPANN [[id number]]      What is the request in detail: Patient is requesting a call back. Pt would like to discuss scheduling her procedure.   Please advise.     Can the clinic reply by [facility]? No      Would the patient rather a call back or a response via [facility]? Call back       Best call back number: [phone] (home) [phone] (work)      Additional Information:

## 2023-10-09 NOTE — TELEPHONE ENCOUNTER
Staff called pt and assisted her with her problem.      ----- Message from Joyce Breonna sent at 10/9/2023  2:36 PM CDT -----  Contact: HOMER SPANN [[id number]]  Type: Call Back      Who called: HOMER SPANN [[id number]]      What is the request in detail: Patient is requesting a call back. Pt would like to discuss scheduling her procedure.   Please advise.     Can the clinic reply by [facility]? No      Would the patient rather a call back or a response via [facility]? Call back       Best call back number: [phone] (home) [phone] (work)      Additional Information:

## 2023-10-11 DIAGNOSIS — M54.16 LUMBAR RADICULOPATHY: ICD-10-CM

## 2023-10-11 DIAGNOSIS — M48.062 SPINAL STENOSIS OF LUMBAR REGION WITH NEUROGENIC CLAUDICATION: Primary | ICD-10-CM

## (undated) DEVICE — KIT NERVE BLOCK PREP BAPTIST

## (undated) DEVICE — TRAY NERVE BLOCK

## (undated) DEVICE — NDL SPINAL 22GX5